# Patient Record
Sex: MALE | Race: WHITE | NOT HISPANIC OR LATINO | Employment: OTHER | URBAN - METROPOLITAN AREA
[De-identification: names, ages, dates, MRNs, and addresses within clinical notes are randomized per-mention and may not be internally consistent; named-entity substitution may affect disease eponyms.]

---

## 2017-05-11 ENCOUNTER — LAB VISIT (OUTPATIENT)
Dept: LAB | Facility: HOSPITAL | Age: 57
End: 2017-05-11
Attending: INTERNAL MEDICINE
Payer: OTHER MISCELLANEOUS

## 2017-05-11 ENCOUNTER — OFFICE VISIT (OUTPATIENT)
Dept: HEPATOLOGY | Facility: CLINIC | Age: 57
End: 2017-05-11
Payer: OTHER MISCELLANEOUS

## 2017-05-11 VITALS
TEMPERATURE: 97 F | HEART RATE: 56 BPM | HEIGHT: 74 IN | BODY MASS INDEX: 27.25 KG/M2 | RESPIRATION RATE: 18 BRPM | WEIGHT: 212.31 LBS | DIASTOLIC BLOOD PRESSURE: 76 MMHG | SYSTOLIC BLOOD PRESSURE: 118 MMHG | OXYGEN SATURATION: 100 %

## 2017-05-11 DIAGNOSIS — B18.2 HEP C W/O COMA, CHRONIC: ICD-10-CM

## 2017-05-11 DIAGNOSIS — K74.69 OTHER CIRRHOSIS OF LIVER: ICD-10-CM

## 2017-05-11 DIAGNOSIS — K70.30 ALCOHOLIC CIRRHOSIS OF LIVER WITHOUT ASCITES: ICD-10-CM

## 2017-05-11 DIAGNOSIS — K74.69 OTHER CIRRHOSIS OF LIVER: Primary | ICD-10-CM

## 2017-05-11 DIAGNOSIS — K76.9 LIVER LESION: ICD-10-CM

## 2017-05-11 LAB
AFP SERPL-MCNC: 5.6 NG/ML
ALBUMIN SERPL BCP-MCNC: 3.9 G/DL
ALP SERPL-CCNC: 85 U/L
ALT SERPL W/O P-5'-P-CCNC: 37 U/L
ANION GAP SERPL CALC-SCNC: 7 MMOL/L
AST SERPL-CCNC: 29 U/L
BASOPHILS # BLD AUTO: 0.02 K/UL
BASOPHILS NFR BLD: 0.5 %
BILIRUB SERPL-MCNC: 0.6 MG/DL
BUN SERPL-MCNC: 21 MG/DL
CALCIUM SERPL-MCNC: 9.2 MG/DL
CHLORIDE SERPL-SCNC: 106 MMOL/L
CO2 SERPL-SCNC: 27 MMOL/L
CREAT SERPL-MCNC: 1.4 MG/DL
DIFFERENTIAL METHOD: ABNORMAL
EOSINOPHIL # BLD AUTO: 0.1 K/UL
EOSINOPHIL NFR BLD: 2.8 %
ERYTHROCYTE [DISTWIDTH] IN BLOOD BY AUTOMATED COUNT: 12.8 %
EST. GFR  (AFRICAN AMERICAN): >60 ML/MIN/1.73 M^2
EST. GFR  (NON AFRICAN AMERICAN): 55.4 ML/MIN/1.73 M^2
GLUCOSE SERPL-MCNC: 91 MG/DL
HCT VFR BLD AUTO: 44.9 %
HGB BLD-MCNC: 15.8 G/DL
INR PPP: 0.9
LYMPHOCYTES # BLD AUTO: 1.4 K/UL
LYMPHOCYTES NFR BLD: 33.3 %
MCH RBC QN AUTO: 30.2 PG
MCHC RBC AUTO-ENTMCNC: 35.2 %
MCV RBC AUTO: 86 FL
MONOCYTES # BLD AUTO: 0.3 K/UL
MONOCYTES NFR BLD: 7.4 %
NEUTROPHILS # BLD AUTO: 2.4 K/UL
NEUTROPHILS NFR BLD: 55.8 %
PLATELET # BLD AUTO: 148 K/UL
PMV BLD AUTO: 8.8 FL
POTASSIUM SERPL-SCNC: 5.2 MMOL/L
PROT SERPL-MCNC: 7.4 G/DL
PROTHROMBIN TIME: 10.2 SEC
RBC # BLD AUTO: 5.24 M/UL
SODIUM SERPL-SCNC: 140 MMOL/L
WBC # BLD AUTO: 4.3 K/UL

## 2017-05-11 PROCEDURE — 87522 HEPATITIS C REVRS TRNSCRPJ: CPT

## 2017-05-11 PROCEDURE — 99213 OFFICE O/P EST LOW 20 MIN: CPT | Mod: S$GLB,,, | Performed by: INTERNAL MEDICINE

## 2017-05-11 PROCEDURE — 85610 PROTHROMBIN TIME: CPT

## 2017-05-11 PROCEDURE — 82105 ALPHA-FETOPROTEIN SERUM: CPT

## 2017-05-11 PROCEDURE — 99999 PR PBB SHADOW E&M-EST. PATIENT-LVL IV: CPT | Mod: PBBFAC,,, | Performed by: INTERNAL MEDICINE

## 2017-05-11 PROCEDURE — 85025 COMPLETE CBC W/AUTO DIFF WBC: CPT

## 2017-05-11 PROCEDURE — 80053 COMPREHEN METABOLIC PANEL: CPT

## 2017-05-11 NOTE — PROGRESS NOTES
HEPATOLOGY FOLLOW UP    Referring Physician: Primary Doctor No    Current Corresponding Physician: Primary Doctor No    Philip Presley is here for follow up of HCV-induced Cirrhosis      HPI   Philip Presley has been followed by our NP in the past. He has previously been treated with harvoni and achieved an SVR12. Early cirrhosis was found on liver biopsy, F3. His liver disease is complicated by thrombocytopenia, mildly elevated LFTs (ALT 37). The patient denies any symptoms of decompensated cirrhosis, including no ascites or edema, cognitive problems that would suggest hepatic encephalopathy, or GI bleeding from varices. No varices on EGD 05/16.    MRI 05/2016:  --There is a 1.2 cm T2 hyperintense lesion within hepatic segment VII which demonstrates homogeneous arterial enhancement which persists on delayed phases, unchanged from prior study and compatible with a small hemangioma.  --There are multiple additional areas of nodular arterial hyperenhancement throughout the liver, the largest measuring 1.2 cm in hepatic segment VIII, which appears decreased in size from prior study.  The additional enhancing lesions otherwise similar in extent to prior study, mostly subcentimeter in size, and become isointense to the liver on delayed phases.  These remain nonspecific and may represent regenerative or dysplastic nodules.    Outpatient Encounter Prescriptions as of 5/11/2017   Medication Sig Dispense Refill    chlorzoxazone (PARAFON FORTE) 500 mg Tab Take 500 mg by mouth.  2    diclofenac (VOLTAREN) 75 MG EC tablet Take 75 mg by mouth once daily.  4    LYRICA 150 mg capsule Take 150 capsules by mouth.  2    tramadol (ULTRAM) 50 mg tablet Take 50 mg by mouth.  2    zolpidem (AMBIEN CR) 12.5 MG CR tablet   2     No facility-administered encounter medications on file as of 5/11/2017.      Review of patient's allergies indicates:   Allergen Reactions    Penicillins Hives     Reaction to it when pt was a baby.     Past  Medical History:   Diagnosis Date    Cirrhosis     Fibromyalgia        Review of Systems   Constitutional: Negative.    HENT: Negative.    Eyes: Negative.    Respiratory: Negative.    Cardiovascular: Negative.    Gastrointestinal: Negative.    Genitourinary: Negative.    Musculoskeletal: Negative.    Skin: Negative.    Neurological: Negative.    Psychiatric/Behavioral: Negative.      Vitals:    05/11/17 0902   BP: 118/76   Pulse: (!) 56   Resp: 18   Temp: 96.6 °F (35.9 °C)       Physical Exam   Constitutional: He is oriented to person, place, and time. He appears well-developed and well-nourished.   HENT:   Head: Normocephalic and atraumatic.   Eyes: Conjunctivae and EOM are normal. Pupils are equal, round, and reactive to light. No scleral icterus.   Neck: Normal range of motion. Neck supple. No thyromegaly present.   Cardiovascular: Normal rate, regular rhythm and normal heart sounds.    Pulmonary/Chest: Effort normal and breath sounds normal. He has no rales.   Abdominal: Soft. Bowel sounds are normal. He exhibits no distension and no mass. There is no tenderness.   Musculoskeletal: Normal range of motion. He exhibits no edema.   Neurological: He is alert and oriented to person, place, and time.   Skin: Skin is warm and dry. No rash noted.   Psychiatric: He has a normal mood and affect.   Vitals reviewed.      MELD-Na score: 7 at 5/23/2016  9:03 AM  MELD score: 7 at 5/23/2016  9:03 AM  Calculated from:  Serum Creatinine: 1.1 mg/dL at 5/23/2016  9:03 AM  Serum Sodium: 139 mmol/L (Rounded to 137) at 5/23/2016  9:03 AM  Total Bilirubin: 0.9 mg/dL (Rounded to 1) at 5/23/2016  9:03 AM  INR(ratio): 1.0 at 5/23/2016  9:03 AM  Age: 55 years    Lab Results   Component Value Date    GLU 88 05/23/2016    BUN 17 05/23/2016    CREATININE 1.1 05/23/2016    CALCIUM 9.2 05/23/2016     05/23/2016    K 4.6 05/23/2016     05/23/2016    PROT 7.6 10/17/2016    PROT 6.9 05/23/2016    CO2 31 (H) 10/17/2016    ANIONGAP 6  (L) 05/23/2016    WBC 3.59 (L) 05/23/2016    RBC 5.22 05/23/2016    HGB 16.2 10/17/2016    HCT  10/17/2016      Comment:      (HCT) 47.1    MCV 84 05/23/2016    MCH 29.9 05/23/2016    MCHC 35.6 05/23/2016     Lab Results   Component Value Date    RDW 12.8 05/23/2016     10/17/2016    MPV 9.9 05/23/2016    GRAN 1.7 (L) 05/23/2016    GRAN 47.7 05/23/2016    LYMPH 1.5 05/23/2016    LYMPH 42.3 05/23/2016    MONO 0.2 (L) 05/23/2016    MONO 6.1 05/23/2016    EOSINOPHIL 3.3 05/23/2016    BASOPHIL 0.6 05/23/2016    EOS 0.1 05/23/2016    BASO 0.02 05/23/2016    ALBUMIN 3.8 05/23/2016    AST 36 10/17/2016    ALT 34 10/17/2016    ALKPHOS 89 05/23/2016    LABPROT 10.5 10/17/2016    INR 1.0 10/17/2016    INR 1.0 05/23/2016       Assessment and Plan:    Philip Presley is a 56 y.o. male with HCV-induced Cirrhosis  My current recommendations:  1. Compensated cirrhosis: MELD 7: remains medically early for liver transplant. Check meld labs every 6 months. Screen for HCC every 6 months, or sooner if suspicious lesions  2. Hepatic lesions, multiple: repeat MRI now  3. HCV SVR: check HCV RNA today and if negative, no need to repeat in the future  4. EGD for variceal screening: repeat in 2018.  Return 6 months

## 2017-05-11 NOTE — MR AVS SNAPSHOT
[facility] Hepatology  [address]  [facility] [address]  Phone: [phone]  Fax: [phone]                  Philip Presley   2017 9:20 AM   Office Visit    Description:  Male : 1960   Provider:  Gem Vasquez MD   Department:  [facility]           Reason for Visit     Cirrhosis           Diagnoses this Visit        Comments    Other cirrhosis of liver    -  Primary            To Do List           Future Appointments        Provider Department Dept Phone    2017 1:00 PM Lee Ann Slaughter MD [facility] - Rheumatology [phone]      Goals (5 Years of Data)     None      [facility]     [facility] On Call Nurse Care Line -  Assistance  Unless otherwise directed by your provider, please contact [facility], our nurse care line that is available for  assistance.     Registered nurses in the [facility] On Call Center provide: appointment scheduling, clinical advisement, health education, and other advisory services.  Call: [phone] (toll free)               Medications           Message regarding Medications     Verify the changes and/or additions to your medication regime listed below are the same as discussed with your clinician today.  If any of these changes or additions are incorrect, please notify your healthcare provider.             Verify that the below list of medications is an accurate representation of the medications you are currently taking.  If none reported, the list may be blank. If incorrect, please contact your healthcare provider. Carry this list with you in case of emergency.           Current Medications     chlorzoxazone (PARAFON FORTE) 500 mg Tab Take 500 mg by mouth.    diclofenac (VOLTAREN) 75 MG EC tablet Take 75 mg by mouth once daily.    LYRICA 150 mg capsule Take 150 capsules by mouth.    tramadol (ULTRAM) 50 mg tablet Take 50 mg by mouth.    zolpidem (AMBIEN CR) 12.5 MG CR tablet            Clinical Reference  "Information           Your Vitals Were     BP Pulse Temp Resp Height Weight    118/76 (BP Location: Left arm, Patient Position: Sitting) 56 96.6 °F (35.9 °C) (Oral) 18 6' 2" (1.88 m) 96.3 kg (212 lb 4.9 oz)    SpO2 BMI             100% 27.26 kg/m2         Blood Pressure          Most Recent Value    BP  118/76      Allergies as of 5/11/2017     Penicillins      Immunizations Administered on Date of Encounter - 5/11/2017     None      Orders Placed During Today's Visit     Future Labs/Procedures Expected by Expires    AFP tumor marker  5/11/2017 7/10/2018    CBC auto differential  5/11/2017 5/11/2018    Comprehensive metabolic panel  5/11/2017 5/11/2018    Hepatitis C RNA, quantitative, PCR  5/11/2017 5/11/2018    MRI Abdomen W WO Contrast  5/11/2017 5/11/2018    Protime-INR  5/11/2017 5/11/2018      Instructions    1. Labs today  2. MRI now to f/u on liver lesions  3. HCV RNA  Return 6 months       Language Assistance Services     ATTENTION: Language assistance services are available, free of charge. Please call [phone].      ATENCIÓN: Si habla español, tiene a davis disposición servicios gratuitos de asistencia lingüística. Llame al [phone].     BUSHRA Ý: N?u b?n nói Ti?ng Vi?t, có các d?ch v? h? tr? ngôn ng? mi?n phí dành cho b?n. G?i s? [phone].         [facility] complies with applicable Federal civil rights laws and does not discriminate on the basis of race, color, national origin, age, disability, or sex.        "

## 2017-05-12 LAB
HCV LOG: <1.08 LOG (10) IU/ML
HCV RNA QUANT PCR: <12 IU/ML
HCV, QUALITATIVE: NOT DETECTED IU/ML

## 2017-05-29 ENCOUNTER — HOSPITAL ENCOUNTER (OUTPATIENT)
Dept: RADIOLOGY | Facility: HOSPITAL | Age: 57
Discharge: HOME | End: 2017-05-29
Attending: INTERNAL MEDICINE
Payer: OTHER MISCELLANEOUS

## 2017-05-29 DIAGNOSIS — K74.69 OTHER CIRRHOSIS OF LIVER: ICD-10-CM

## 2017-05-29 PROCEDURE — 25500020 PHARM REV CODE 255: Performed by: INTERNAL MEDICINE

## 2017-05-29 PROCEDURE — 74183 MRI ABD W/O CNTR FLWD CNTR: CPT | Mod: TC

## 2017-05-29 PROCEDURE — A9585 GADOBUTROL INJECTION: HCPCS | Performed by: INTERNAL MEDICINE

## 2017-05-29 PROCEDURE — 74183 MRI ABD W/O CNTR FLWD CNTR: CPT | Mod: 26,,, | Performed by: RADIOLOGY

## 2017-05-29 RX ORDER — GADOBUTROL 604.72 MG/ML
10 INJECTION INTRAVENOUS
Status: COMPLETED | OUTPATIENT
Start: 2017-05-29 | End: 2017-05-29

## 2017-05-29 RX ADMIN — GADOBUTROL 10 ML: 604.72 INJECTION INTRAVENOUS at 11:05

## 2017-05-30 ENCOUNTER — TELEPHONE (OUTPATIENT)
Dept: HEPATOLOGY | Facility: CLINIC | Age: 57
End: 2017-05-30

## 2017-05-30 DIAGNOSIS — K76.9 LIVER LESION: Primary | ICD-10-CM

## 2017-05-31 ENCOUNTER — PATIENT MESSAGE (OUTPATIENT)
Dept: HEPATOLOGY | Facility: CLINIC | Age: 57
End: 2017-05-31

## 2017-05-31 ENCOUNTER — TELEPHONE (OUTPATIENT)
Dept: HEPATOLOGY | Facility: CLINIC | Age: 57
End: 2017-05-31

## 2017-05-31 NOTE — TELEPHONE ENCOUNTER
Called and spoke with patient, relayed message from Dr Vasquez.Recall placed in system for MRI.  Stable lesion on mri - repeat in 6 months - please let patient know.   Patient verbalizes understanding.

## 2017-10-12 ENCOUNTER — TELEPHONE (OUTPATIENT)
Dept: HEPATOLOGY | Facility: CLINIC | Age: 57
End: 2017-10-12

## 2017-10-12 NOTE — TELEPHONE ENCOUNTER
----- Message from Jolie Fleming sent at 10/11/2017  4:26 PM CDT -----  Contact: Pt  Pt received a letter to schedule an MRI & appt with ,    Pt contact number [phone]  Thanks

## 2017-11-17 ENCOUNTER — HOSPITAL ENCOUNTER (OUTPATIENT)
Dept: RADIOLOGY | Facility: HOSPITAL | Age: 57
Discharge: HOME | End: 2017-11-17
Attending: INTERNAL MEDICINE
Payer: OTHER MISCELLANEOUS

## 2017-11-17 ENCOUNTER — OFFICE VISIT (OUTPATIENT)
Dept: HEPATOLOGY | Facility: CLINIC | Age: 57
End: 2017-11-17
Payer: OTHER MISCELLANEOUS

## 2017-11-17 ENCOUNTER — LAB VISIT (OUTPATIENT)
Dept: LAB | Facility: HOSPITAL | Age: 57
End: 2017-11-17
Attending: INTERNAL MEDICINE
Payer: OTHER MISCELLANEOUS

## 2017-11-17 VITALS
WEIGHT: 197.31 LBS | TEMPERATURE: 98 F | OXYGEN SATURATION: 97 % | BODY MASS INDEX: 25.32 KG/M2 | HEIGHT: 74 IN | HEART RATE: 74 BPM | RESPIRATION RATE: 18 BRPM | SYSTOLIC BLOOD PRESSURE: 119 MMHG | DIASTOLIC BLOOD PRESSURE: 80 MMHG

## 2017-11-17 DIAGNOSIS — K74.60 CIRRHOSIS OF LIVER WITHOUT ASCITES, UNSPECIFIED HEPATIC CIRRHOSIS TYPE: ICD-10-CM

## 2017-11-17 DIAGNOSIS — B18.2 HEP C W/O COMA, CHRONIC: ICD-10-CM

## 2017-11-17 DIAGNOSIS — K76.9 LIVER LESION: ICD-10-CM

## 2017-11-17 DIAGNOSIS — K74.69 OTHER CIRRHOSIS OF LIVER: Primary | ICD-10-CM

## 2017-11-17 DIAGNOSIS — L80 VITILIGO: ICD-10-CM

## 2017-11-17 DIAGNOSIS — K74.69 OTHER CIRRHOSIS OF LIVER: ICD-10-CM

## 2017-11-17 LAB
AFP SERPL-MCNC: 4.5 NG/ML
ALBUMIN SERPL BCP-MCNC: 3.8 G/DL
ALP SERPL-CCNC: 68 U/L
ALT SERPL W/O P-5'-P-CCNC: 35 U/L
ANION GAP SERPL CALC-SCNC: 9 MMOL/L
AST SERPL-CCNC: 27 U/L
BASOPHILS # BLD AUTO: 0.02 K/UL
BASOPHILS NFR BLD: 0.4 %
BILIRUB SERPL-MCNC: 1 MG/DL
BUN SERPL-MCNC: 14 MG/DL
CALCIUM SERPL-MCNC: 9.9 MG/DL
CHLORIDE SERPL-SCNC: 106 MMOL/L
CO2 SERPL-SCNC: 27 MMOL/L
CREAT SERPL-MCNC: 1.2 MG/DL
DIFFERENTIAL METHOD: NORMAL
EOSINOPHIL # BLD AUTO: 0.1 K/UL
EOSINOPHIL NFR BLD: 1.8 %
ERYTHROCYTE [DISTWIDTH] IN BLOOD BY AUTOMATED COUNT: 13 %
EST. GFR  (AFRICAN AMERICAN): >60 ML/MIN/1.73 M^2
EST. GFR  (NON AFRICAN AMERICAN): >60 ML/MIN/1.73 M^2
GLUCOSE SERPL-MCNC: 85 MG/DL
HCT VFR BLD AUTO: 43.9 %
HGB BLD-MCNC: 15.3 G/DL
IMM GRANULOCYTES # BLD AUTO: 0.01 K/UL
IMM GRANULOCYTES NFR BLD AUTO: 0.2 %
INR PPP: 1
LYMPHOCYTES # BLD AUTO: 1.4 K/UL
LYMPHOCYTES NFR BLD: 27.7 %
MCH RBC QN AUTO: 30.2 PG
MCHC RBC AUTO-ENTMCNC: 34.9 G/DL
MCV RBC AUTO: 87 FL
MONOCYTES # BLD AUTO: 0.3 K/UL
MONOCYTES NFR BLD: 6.6 %
NEUTROPHILS # BLD AUTO: 3.2 K/UL
NEUTROPHILS NFR BLD: 63.3 %
NRBC BLD-RTO: 0 /100 WBC
PLATELET # BLD AUTO: 202 K/UL
PMV BLD AUTO: 9.4 FL
POTASSIUM SERPL-SCNC: 4.4 MMOL/L
PROT SERPL-MCNC: 7.4 G/DL
PROTHROMBIN TIME: 10.5 SEC
RBC # BLD AUTO: 5.06 M/UL
SODIUM SERPL-SCNC: 142 MMOL/L
WBC # BLD AUTO: 5.02 K/UL

## 2017-11-17 PROCEDURE — 99999 PR PBB SHADOW E&M-EST. PATIENT-LVL IV: CPT | Mod: PBBFAC,,, | Performed by: INTERNAL MEDICINE

## 2017-11-17 PROCEDURE — 74183 MRI ABD W/O CNTR FLWD CNTR: CPT | Mod: 26,,, | Performed by: RADIOLOGY

## 2017-11-17 PROCEDURE — 25500020 PHARM REV CODE 255: Performed by: INTERNAL MEDICINE

## 2017-11-17 PROCEDURE — 85025 COMPLETE CBC W/AUTO DIFF WBC: CPT

## 2017-11-17 PROCEDURE — 80053 COMPREHEN METABOLIC PANEL: CPT

## 2017-11-17 PROCEDURE — 36415 COLL VENOUS BLD VENIPUNCTURE: CPT

## 2017-11-17 PROCEDURE — A9585 GADOBUTROL INJECTION: HCPCS | Performed by: INTERNAL MEDICINE

## 2017-11-17 PROCEDURE — 85610 PROTHROMBIN TIME: CPT

## 2017-11-17 PROCEDURE — 82105 ALPHA-FETOPROTEIN SERUM: CPT

## 2017-11-17 PROCEDURE — 74183 MRI ABD W/O CNTR FLWD CNTR: CPT | Mod: TC

## 2017-11-17 PROCEDURE — 99214 OFFICE O/P EST MOD 30 MIN: CPT | Mod: S$GLB,,, | Performed by: INTERNAL MEDICINE

## 2017-11-17 RX ORDER — GADOBUTROL 604.72 MG/ML
10 INJECTION INTRAVENOUS
Status: COMPLETED | OUTPATIENT
Start: 2017-11-17 | End: 2017-11-17

## 2017-11-17 RX ADMIN — GADOBUTROL 10 ML: 604.72 INJECTION INTRAVENOUS at 01:11

## 2017-11-17 NOTE — PROGRESS NOTES
HEPATOLOGY FOLLOW UP    Referring Physician: Primary Doctor No    Current Corresponding Physician: Primary Doctor No    Philip Presley is here for follow up of HCV-induced Cirrhosis      HPI   Philip Presley has been followed by our NP in the past. He has previously been treated with harvoni and achieved an SVR12 HCV VL last checked 05/17 and was still negative; hx of liver biopsy, F3. His liver disease is complicated by thrombocytopenia, mildly elevated LFTs (ALT 37). The low plts would suggest cirrhosis. The patient denies any symptoms of decompensated cirrhosis, including no ascites or edema, cognitive problems that would suggest hepatic encephalopathy, or GI bleeding from varices. No varices on EGD 05/16.    He is feeling well. He denies N/V, abdominal pain or diarrhea. He has brought to my attention that the acquisition of his HCV was likely work-related. He is still in the midst of resolution of a workman's comp claim re this.     MRI 05/2016:  --There is a 1.2 cm T2 hyperintense lesion within hepatic segment VII which demonstrates homogeneous arterial enhancement which persists on delayed phases, unchanged from prior study and compatible with a small hemangioma.  --There are multiple additional areas of nodular arterial hyperenhancement throughout the liver, the largest measuring 1.2 cm in hepatic segment VIII, which appears decreased in size from prior study.  The additional enhancing lesions otherwise similar in extent to prior study, mostly subcentimeter in size, and become isointense to the liver on delayed phases.  These remain nonspecific and may represent regenerative or dysplastic nodules.    MRI 11/1717:  Multiple stable areas of arterial hyperenhancement seen scattered throughout the hepatic parenchyma.  No new lesions identified.        Outpatient Encounter Prescriptions as of 11/17/2017   Medication Sig Dispense Refill    chlorzoxazone (PARAFON FORTE) 500 mg Tab Take 500 mg by mouth.  2     diclofenac (VOLTAREN) 75 MG EC tablet Take 75 mg by mouth once daily.  4    LYRICA 150 mg capsule Take 150 capsules by mouth.  2    tramadol (ULTRAM) 50 mg tablet Take 50 mg by mouth.  2    zolpidem (AMBIEN CR) 12.5 MG CR tablet   2     Facility-Administered Encounter Medications as of 11/17/2017   Medication Dose Route Frequency Provider Last Rate Last Dose    [COMPLETED] gadobutrol 10 mL  10 mL Intravenous ONCE PRN Gem Vasquez MD   10 mL at 11/17/17 1340     Review of patient's allergies indicates:   Allergen Reactions    Penicillins Hives     Reaction to it when pt was a baby.     Past Medical History:   Diagnosis Date    Cirrhosis     Fibromyalgia        Review of Systems   Constitutional: Negative.    HENT: Negative.    Eyes: Negative.    Respiratory: Negative.    Cardiovascular: Negative.    Gastrointestinal: Negative.    Genitourinary: Negative.    Musculoskeletal: Negative.    Skin: Negative.    Neurological: Negative.    Psychiatric/Behavioral: Negative.      Vitals:    11/17/17 1450   BP: 119/80   Pulse: 74   Resp: 18   Temp: 98 °F (36.7 °C)       Physical Exam   Constitutional: He is oriented to person, place, and time. He appears well-developed and well-nourished.   HENT:   Head: Normocephalic and atraumatic.   Eyes: Conjunctivae and EOM are normal. Pupils are equal, round, and reactive to light. No scleral icterus.   Neck: Normal range of motion. Neck supple. No thyromegaly present.   Cardiovascular: Normal rate, regular rhythm and normal heart sounds.    Pulmonary/Chest: Effort normal and breath sounds normal. He has no rales.   Abdominal: Soft. Bowel sounds are normal. He exhibits no distension and no mass. There is no tenderness.   Musculoskeletal: Normal range of motion. He exhibits no edema.   Neurological: He is alert and oriented to person, place, and time.   Skin: Skin is warm and dry. No rash noted.   +vitiligo   Psychiatric: He has a normal mood and affect.   Vitals  reviewed.      MELD-Na score: 10 at 5/11/2017 10:03 AM  MELD score: 10 at 5/11/2017 10:03 AM  Calculated from:  Serum Creatinine: 1.4 mg/dL at 5/11/2017 10:03 AM  Serum Sodium: 140 mmol/L (Rounded to 137) at 5/11/2017 10:03 AM  Total Bilirubin: 0.6 mg/dL (Rounded to 1) at 5/11/2017 10:03 AM  INR(ratio): 0.9 (Rounded to 1) at 5/11/2017 10:03 AM  Age: 56 years    Lab Results   Component Value Date    GLU 91 05/11/2017    BUN 21 (H) 05/11/2017    CREATININE 1.4 05/11/2017    CALCIUM 9.2 05/11/2017     05/11/2017    K 5.2 (H) 05/11/2017     05/11/2017    PROT 7.4 05/11/2017    CO2 27 05/11/2017    CO2 31 (H) 10/17/2016    ANIONGAP 7 (L) 05/11/2017    WBC 4.30 05/11/2017    RBC 5.24 05/11/2017    HGB 15.8 05/11/2017    HCT 44.9 05/11/2017    MCV 86 05/11/2017    MCH 30.2 05/11/2017    MCHC 35.2 05/11/2017     Lab Results   Component Value Date    RDW 12.8 05/11/2017     (L) 05/11/2017    MPV 8.8 (L) 05/11/2017    GRAN 2.4 05/11/2017    GRAN 55.8 05/11/2017    LYMPH 1.4 05/11/2017    LYMPH 33.3 05/11/2017    MONO 0.3 05/11/2017    MONO 7.4 05/11/2017    EOSINOPHIL 2.8 05/11/2017    BASOPHIL 0.5 05/11/2017    EOS 0.1 05/11/2017    BASO 0.02 05/11/2017    ALBUMIN 3.9 05/11/2017    AST 29 05/11/2017    AST 36 10/17/2016    ALT 37 05/11/2017    ALT 34 10/17/2016    ALKPHOS 85 05/11/2017    LABPROT 10.2 05/11/2017    INR 0.9 05/11/2017       Assessment and Plan:    Philip Presley is a 57 y.o. male with HCV-induced Cirrhosis  My current recommendations:  1. Compensated cirrhosis: MELD 10: remains medically early for liver transplant. Check meld labs every 6 months. Screen for HCC every 6 months, or sooner if suspicious lesions  2. Hepatic lesions, multiple, stable: repeat MRI in 6 mos- if remain stable consider switch to US  3. HCV SVR: has attained an SVR  4. EGD for variceal screening: repeat in 2018/19.  5. Vitiligo: started 2010 (after dx of HCV). HCV is associated with viligo.  Return 6 months    .

## 2017-11-17 NOTE — PATIENT INSTRUCTIONS
1. EGD in 2018/19  2. Labs today and in 6 months  3. Repeat MRI in 6 months then if stable will do US 6 months later  4. Vitiligo-started 2010 (after HCV  Dx)  Return 6 months

## 2017-11-19 PROBLEM — L80 VITILIGO: Status: ACTIVE | Noted: 2017-11-19

## 2017-12-22 ENCOUNTER — TELEPHONE (OUTPATIENT)
Dept: HEPATOLOGY | Facility: CLINIC | Age: 57
End: 2017-12-22

## 2017-12-22 NOTE — TELEPHONE ENCOUNTER
MA called marco antonio back she is unable to reached left her VM to please give us a callback. IVAN

## 2017-12-22 NOTE — TELEPHONE ENCOUNTER
----- Message from Adebayo Gillespie sent at 12/22/2017  8:34 AM CST -----  Contact: Uzma Sadler- [place] [employer]  Please call regarding scheduling an appt for rehab conference.    Call her @ [phone]

## 2018-01-11 NOTE — TELEPHONE ENCOUNTER
Judy faxed workers comp form to be filled out.     Faxed the form to our HIM/Disability dept. X [phone]

## 2018-02-01 ENCOUNTER — TELEPHONE (OUTPATIENT)
Dept: GASTROENTEROLOGY | Facility: CLINIC | Age: 58
End: 2018-02-01

## 2018-02-01 NOTE — TELEPHONE ENCOUNTER
Call to pt's  re his status. Explained that he is cured of HCV and has cirrhosis but no liver dysfunction. Did not specifically address the questions that WC has asked. Judy agreed to have pt come back to see me to discus. Called pt and let him know. He is agreeable to coming in.

## 2018-02-15 ENCOUNTER — OFFICE VISIT (OUTPATIENT)
Dept: HEPATOLOGY | Facility: CLINIC | Age: 58
End: 2018-02-15
Payer: OTHER MISCELLANEOUS

## 2018-02-15 VITALS
TEMPERATURE: 98 F | WEIGHT: 203.06 LBS | BODY MASS INDEX: 26.06 KG/M2 | HEART RATE: 57 BPM | HEIGHT: 74 IN | RESPIRATION RATE: 18 BRPM | SYSTOLIC BLOOD PRESSURE: 119 MMHG | DIASTOLIC BLOOD PRESSURE: 77 MMHG | OXYGEN SATURATION: 99 %

## 2018-02-15 DIAGNOSIS — K76.9 LIVER LESION: ICD-10-CM

## 2018-02-15 DIAGNOSIS — K76.6 HYPERTENSION, PORTAL: ICD-10-CM

## 2018-02-15 DIAGNOSIS — B18.2 HEP C W/O COMA, CHRONIC: ICD-10-CM

## 2018-02-15 DIAGNOSIS — K74.60 CIRRHOSIS OF LIVER WITHOUT ASCITES, UNSPECIFIED HEPATIC CIRRHOSIS TYPE: Primary | ICD-10-CM

## 2018-02-15 PROCEDURE — 3008F BODY MASS INDEX DOCD: CPT | Mod: S$GLB,,, | Performed by: INTERNAL MEDICINE

## 2018-02-15 PROCEDURE — 99999 PR PBB SHADOW E&M-EST. PATIENT-LVL IV: CPT | Mod: PBBFAC,,, | Performed by: INTERNAL MEDICINE

## 2018-02-15 PROCEDURE — 99214 OFFICE O/P EST MOD 30 MIN: CPT | Mod: S$GLB,,, | Performed by: INTERNAL MEDICINE

## 2018-02-15 NOTE — PROGRESS NOTES
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    HPI   Philip Presley has previously been treated with harvoni and achieved an SVR12 HCV VL last checked 05/17 and was still negative; hx of liver biopsy, F3. His liver disease is complicated by thrombocytopenia, mildly elevated LFTs (ALT 37). The low plts would suggest cirrhosis. Platelets today are improved to 214. The patient denies any symptoms of decompensated cirrhosis, including no ascites or edema, cognitive problems that would suggest hepatic encephalopathy, or GI bleeding from varices. No varices on EGD 05/16.    He is feeling well. He denies N/V, abdominal pain or diarrhea.     Liver function is normal: Tbil 0.6, albumin 4.0, creat 1.2, Na 141, INR 1.0.    MRI 11/1717:  Multiple stable areas of arterial hyperenhancement seen scattered throughout the hepatic parenchyma.  No new lesions identified. Nothing to suggest hepatoma. AFP is 5.1 today.    Outpatient Encounter Prescriptions as of 2/15/2018   Medication Sig Dispense Refill    chlorzoxazone (PARAFON FORTE) 500 mg Tab Take 500 mg by mouth.  2    diclofenac (VOLTAREN) 75 MG EC tablet Take 75 mg by mouth once daily.  4    LYRICA 150 mg capsule Take 150 capsules by mouth 2 (two) times daily.   2    tramadol (ULTRAM) 50 mg tablet Take 100 mg by mouth every 4 (four) hours as needed.   2    zolpidem (AMBIEN CR) 12.5 MG CR tablet Take 12.5 mg by mouth nightly as needed.   2     No facility-administered encounter medications on file as of 2/15/2018.      Review of patient's allergies indicates:   Allergen Reactions    Penicillins Hives     Reaction to it when pt was a baby.     Past Medical History:   Diagnosis Date    Cirrhosis     Fibromyalgia     Vitiligo 11/19/2017       Review of Systems    Constitutional: Negative.    HENT: Negative.    Eyes: Negative.    Respiratory: Negative.    Cardiovascular: Negative.    Gastrointestinal: Negative.    Genitourinary: Negative.    Musculoskeletal: Negative.    Skin: Negative.    Neurological: Negative.    Psychiatric/Behavioral: Negative.      Vitals:    02/15/18 1555   BP: 119/77   Pulse: (!) 57   Resp: 18   Temp: 97.8 °F (36.6 °C)       Physical Exam   Constitutional: He is oriented to person, place, and time. He appears well-developed and well-nourished.   HENT:   Head: Normocephalic and atraumatic.   Eyes: Conjunctivae and EOM are normal. Pupils are equal, round, and reactive to light. No scleral icterus.   Neck: Normal range of motion. Neck supple. No thyromegaly present.   Cardiovascular: Normal rate, regular rhythm and normal heart sounds.    Pulmonary/Chest: Effort normal and breath sounds normal. He has no rales.   Abdominal: Soft. Bowel sounds are normal. He exhibits no distension and no mass. There is no tenderness.   Musculoskeletal: Normal range of motion. He exhibits no edema.   Neurological: He is alert and oriented to person, place, and time.   Skin: Skin is warm and dry. No rash noted.   +vitiligo   Psychiatric: He has a normal mood and affect.   Vitals reviewed.    MELD-Na score: 8 at 2/15/2018  5:17 PM  MELD score: 8 at 2/15/2018  5:17 PM  Calculated from:  Serum Creatinine: 1.2 mg/dL at 2/15/2018  5:17 PM  Serum Sodium: 141 mmol/L (Rounded to 137) at 2/15/2018  5:17 PM  Total Bilirubin: 0.6 mg/dL (Rounded to 1) at 2/15/2018  5:17 PM  INR(ratio): 1.0 at 2/15/2018  5:17 PM  Age: 57 years      Lab Results   Component Value Date    GLU 85 11/17/2017    BUN 14 11/17/2017    CREATININE 1.2 11/17/2017    CALCIUM 9.9 11/17/2017     11/17/2017    K 4.4 11/17/2017     11/17/2017    PROT 7.4 11/17/2017    CO2 27 11/17/2017    CO2 31 (H) 10/17/2016    ANIONGAP 9 11/17/2017    WBC 5.02 11/17/2017    RBC 5.06 11/17/2017    HGB 15.3 11/17/2017     HCT 43.9 11/17/2017    MCV 87 11/17/2017    MCH 30.2 11/17/2017    MCHC 34.9 11/17/2017     Lab Results   Component Value Date    RDW 13.0 11/17/2017     11/17/2017    MPV 9.4 11/17/2017    GRAN 3.2 11/17/2017    GRAN 63.3 11/17/2017    LYMPH 1.4 11/17/2017    LYMPH 27.7 11/17/2017    MONO 0.3 11/17/2017    MONO 6.6 11/17/2017    EOSINOPHIL 1.8 11/17/2017    BASOPHIL 0.4 11/17/2017    EOS 0.1 11/17/2017    BASO 0.02 11/17/2017    ALBUMIN 3.8 11/17/2017    AST 27 11/17/2017    AST 36 10/17/2016    ALT 35 11/17/2017    ALT 34 10/17/2016    ALKPHOS 68 11/17/2017    LABPROT 10.5 11/17/2017    INR 1.0 11/17/2017       Assessment and Plan:    Philip Presley is a 57 y.o. male with HCV-induced Cirrhosis  My current recommendations:  1. Compensated cirrhosis: MELD 8: remains medically early for liver transplant. Check meld labs every 6 months. Hepatic lesions, multiple, stable: repeat MRI now- if remain stable consider switch to US  3. HCV SVR: has attained an SVR  4. EGD for variceal screening: repeat in 2018/19.  5. Vitiligo: started 2010 (after dx of HCV). HCV is associated with viligo. Discussed that if returns to work, should be in a job that avoids sun exposure.  Return 6 months    . .

## 2018-05-21 ENCOUNTER — LAB VISIT (OUTPATIENT)
Dept: LAB | Facility: HOSPITAL | Age: 58
End: 2018-05-21
Attending: INTERNAL MEDICINE
Payer: OTHER MISCELLANEOUS

## 2018-05-21 ENCOUNTER — HOSPITAL ENCOUNTER (OUTPATIENT)
Dept: RADIOLOGY | Facility: HOSPITAL | Age: 58
Discharge: HOME | End: 2018-05-21
Attending: INTERNAL MEDICINE
Payer: OTHER MISCELLANEOUS

## 2018-05-21 ENCOUNTER — OFFICE VISIT (OUTPATIENT)
Dept: HEPATOLOGY | Facility: CLINIC | Age: 58
End: 2018-05-21
Payer: OTHER MISCELLANEOUS

## 2018-05-21 VITALS
RESPIRATION RATE: 18 BRPM | HEIGHT: 74 IN | WEIGHT: 210.75 LBS | SYSTOLIC BLOOD PRESSURE: 124 MMHG | DIASTOLIC BLOOD PRESSURE: 87 MMHG | BODY MASS INDEX: 27.05 KG/M2 | TEMPERATURE: 98 F | OXYGEN SATURATION: 98 % | HEART RATE: 56 BPM

## 2018-05-21 DIAGNOSIS — R16.0 LIVER MASSES: ICD-10-CM

## 2018-05-21 DIAGNOSIS — R74.8 ELEVATED LIVER ENZYMES: ICD-10-CM

## 2018-05-21 DIAGNOSIS — R21 RASH: ICD-10-CM

## 2018-05-21 DIAGNOSIS — K74.60 CIRRHOSIS OF LIVER WITHOUT ASCITES, UNSPECIFIED HEPATIC CIRRHOSIS TYPE: Primary | ICD-10-CM

## 2018-05-21 DIAGNOSIS — K74.69 OTHER CIRRHOSIS OF LIVER: ICD-10-CM

## 2018-05-21 DIAGNOSIS — K76.6 HYPERTENSION, PORTAL: ICD-10-CM

## 2018-05-21 DIAGNOSIS — Z86.19 HISTORY OF HEPATITIS C: ICD-10-CM

## 2018-05-21 DIAGNOSIS — L80 VITILIGO: ICD-10-CM

## 2018-05-21 LAB
AFP SERPL-MCNC: 5.1 NG/ML
ALBUMIN SERPL BCP-MCNC: 4.3 G/DL
ALP SERPL-CCNC: 69 U/L
ALT SERPL W/O P-5'-P-CCNC: 63 U/L
ANION GAP SERPL CALC-SCNC: 8 MMOL/L
AST SERPL-CCNC: 39 U/L
BASOPHILS # BLD AUTO: 0.03 K/UL
BASOPHILS NFR BLD: 0.7 %
BILIRUB SERPL-MCNC: 0.8 MG/DL
BUN SERPL-MCNC: 18 MG/DL
CALCIUM SERPL-MCNC: 9.7 MG/DL
CHLORIDE SERPL-SCNC: 104 MMOL/L
CO2 SERPL-SCNC: 26 MMOL/L
CREAT SERPL-MCNC: 1.2 MG/DL
DIFFERENTIAL METHOD: NORMAL
EOSINOPHIL # BLD AUTO: 0.2 K/UL
EOSINOPHIL NFR BLD: 3.4 %
ERYTHROCYTE [DISTWIDTH] IN BLOOD BY AUTOMATED COUNT: 12.8 %
EST. GFR  (AFRICAN AMERICAN): >60 ML/MIN/1.73 M^2
EST. GFR  (NON AFRICAN AMERICAN): >60 ML/MIN/1.73 M^2
GLUCOSE SERPL-MCNC: 88 MG/DL
HCT VFR BLD AUTO: 47.6 %
HGB BLD-MCNC: 16.2 G/DL
IMM GRANULOCYTES # BLD AUTO: 0.01 K/UL
IMM GRANULOCYTES NFR BLD AUTO: 0.2 %
INR PPP: 0.9
LYMPHOCYTES # BLD AUTO: 1.1 K/UL
LYMPHOCYTES NFR BLD: 25.7 %
MCH RBC QN AUTO: 29.6 PG
MCHC RBC AUTO-ENTMCNC: 34 G/DL
MCV RBC AUTO: 87 FL
MONOCYTES # BLD AUTO: 0.3 K/UL
MONOCYTES NFR BLD: 7.2 %
NEUTROPHILS # BLD AUTO: 2.8 K/UL
NEUTROPHILS NFR BLD: 62.8 %
NRBC BLD-RTO: 0 /100 WBC
PLATELET # BLD AUTO: 209 K/UL
PMV BLD AUTO: 9.5 FL
POTASSIUM SERPL-SCNC: 5.1 MMOL/L
PROT SERPL-MCNC: 7.6 G/DL
PROTHROMBIN TIME: 9.9 SEC
RBC # BLD AUTO: 5.48 M/UL
SODIUM SERPL-SCNC: 138 MMOL/L
WBC # BLD AUTO: 4.44 K/UL

## 2018-05-21 PROCEDURE — 85610 PROTHROMBIN TIME: CPT

## 2018-05-21 PROCEDURE — 80053 COMPREHEN METABOLIC PANEL: CPT

## 2018-05-21 PROCEDURE — 85025 COMPLETE CBC W/AUTO DIFF WBC: CPT

## 2018-05-21 PROCEDURE — 99213 OFFICE O/P EST LOW 20 MIN: CPT | Mod: S$GLB,,, | Performed by: NURSE PRACTITIONER

## 2018-05-21 PROCEDURE — 25500020 PHARM REV CODE 255: Performed by: INTERNAL MEDICINE

## 2018-05-21 PROCEDURE — 36415 COLL VENOUS BLD VENIPUNCTURE: CPT

## 2018-05-21 PROCEDURE — 74183 MRI ABD W/O CNTR FLWD CNTR: CPT | Mod: 26,,, | Performed by: RADIOLOGY

## 2018-05-21 PROCEDURE — 99999 PR PBB SHADOW E&M-EST. PATIENT-LVL IV: CPT | Mod: PBBFAC,,, | Performed by: NURSE PRACTITIONER

## 2018-05-21 PROCEDURE — A9585 GADOBUTROL INJECTION: HCPCS | Performed by: INTERNAL MEDICINE

## 2018-05-21 PROCEDURE — 82105 ALPHA-FETOPROTEIN SERUM: CPT

## 2018-05-21 PROCEDURE — 3008F BODY MASS INDEX DOCD: CPT | Mod: CPTII,S$GLB,, | Performed by: NURSE PRACTITIONER

## 2018-05-21 PROCEDURE — 74183 MRI ABD W/O CNTR FLWD CNTR: CPT | Mod: TC

## 2018-05-21 RX ORDER — GADOBUTROL 604.72 MG/ML
10 INJECTION INTRAVENOUS
Status: COMPLETED | OUTPATIENT
Start: 2018-05-21 | End: 2018-05-21

## 2018-05-21 RX ADMIN — GADOBUTROL 10 ML: 604.72 INJECTION INTRAVENOUS at 09:05

## 2018-05-21 NOTE — PROGRESS NOTES
[facility] Established Patient Visit    Reason for Visit:  F/u cirrhosis, liver masses    PCP: Primary Doctor No    HPI:  This is a 57 y.o. male pt of Dr. Vasquez, new pt to me, here for f/u of well compensated cirrhosis due to h/o hepatitis C. He was last seen by Dr. Vasquez in 2/2018. He has been successfully treated for his hep C with Harvoni and had SVR 24 as of 10/2016. He had a liver biopsy in the past with stage 3 fibrosis. His cirrhosis has been stable since attaining SVR.     He had labs and MRI today for monitoring of his liver masses. His MRI shows stable lesions, none concerning for HCC. AFP nl. He feels well except for a rash on his forehead that has been persistent since 12/2017. He does have vitiligo from his hepatitis C. He uses a cream he was given for his vitiligo on his forehead that has been helping. He has not seen a dermatologist for his forehead rash. He denies jaundice, dark urine, hematemesis, melena, slowed mentation, abdominal distention.       His labs today show ALT higher than previously at 63, AST 39. He has gained weight since his last visit and since 2017, ~ 10 lbs. Could be d/t NAFLD. He denies any herbal supplements or new meds.       ROS:   GENERAL: Denies fever, chills, (+) weight gain, no fatigue  HEENT: Denies headaches, dizziness, vision/hearing changes  CARDIOVASCULAR: Denies chest pain, palpitations, or edema  RESPIRATORY: Denies dyspnea, cough  GI: Denies abdominal pain, rectal bleeding, nausea, vomiting. No change in bowel pattern or color  : Denies dysuria, hematuria   SKIN: (+) rash, itching   NEURO: Denies confusion, memory loss, or mood changes  PSYCH: Denies depression or anxiety  HEME/LYMPH: Denies easy bruising or bleeding      PMHX:  has a past medical history of Cirrhosis; Fibromyalgia; and Vitiligo (11/19/2017).    PSHX:  has a past surgical history that includes Hand surgery; Nose surgery; and Upper gastrointestinal endoscopy.    The patient's  "social and family histories were reviewed by me and updated in the appropriate section of the electronic medical record.    Review of patient's allergies indicates:   Allergen Reactions    Penicillins Hives     Reaction to it when pt was a baby.       Current Outpatient Prescriptions on File Prior to Visit   Medication Sig Dispense Refill    chlorzoxazone (PARAFON FORTE) 500 mg Tab Take 500 mg by mouth.  2    diclofenac (VOLTAREN) 75 MG EC tablet Take 75 mg by mouth once daily.  4    LYRICA 150 mg capsule Take 150 capsules by mouth 2 (two) times daily.   2    tramadol (ULTRAM) 50 mg tablet Take 100 mg by mouth every 4 (four) hours as needed.   2    zolpidem (AMBIEN CR) 12.5 MG CR tablet Take 12.5 mg by mouth nightly as needed.   2     Current Facility-Administered Medications on File Prior to Visit   Medication Dose Route Frequency Provider Last Rate Last Dose    [COMPLETED] gadobutrol 10 mL  10 mL Intravenous ONCE PRN Gem Vasquez MD   10 mL at 05/21/18 0936         Objective Findings:    PHYSICAL EXAM:   Friendly White male, in no acute distress; alert and oriented to person, place and time  VITALS: /87 (BP Location: Left arm, Patient Position: Sitting)   Pulse (!) 56   Temp 98 °F (36.7 °C) (Oral)   Resp 18   Ht 6' 2" (1.88 m)   Wt 95.6 kg (210 lb 12.2 oz)   SpO2 98%   BMI 27.06 kg/m²   HENT: Normocephalic, without obvious abnormality. Oral mucosa pink and moist. Dentition good.  EYES: Sclerae anicteric.    NECK: Supple.   CARDIOVASCULAR: Regular rate and rhythm. No murmurs.  RESPIRATORY: Normal respiratory effort. BBS CTA. No wheezes or crackles.  GI: Soft, non-tender, non-distended. No hepatosplenomegaly. No masses palpable. No ascites.  EXTREMITIES:  No clubbing, cyanosis or edema.  SKIN: Warm and dry. No jaundice. (+) vitiligo. (+) erythema to forehead with dry skin. (+) telangectasias noted. No palmar erythema.  NEURO:  Normal gate. No asterixis.  PSYCH:  Memory intact. Thought and " speech pattern appropriate. Behavior normal. No depression or anxiety noted.      Labs:  Lab Results   Component Value Date    WBC 4.44 05/21/2018    HGB 16.2 05/21/2018    HCT 47.6 05/21/2018     05/21/2018     05/21/2018    K 5.1 05/21/2018    CREATININE 1.2 05/21/2018    ALT 63 (H) 05/21/2018    AST 39 05/21/2018    ALKPHOS 69 05/21/2018    BILITOT 0.8 05/21/2018    ALBUMIN 4.3 05/21/2018    INR 0.9 05/21/2018    AFP 5.1 05/21/2018     MRI 5/21/18  FINDINGS:  Liver demonstrates multiple subcentimeter enhancing lesions within the right lobe, unchanged or smaller than prior examinations.  No evidence of washout or pseudocapsule formation.  A few of the previously mentioned lesions are not as well demonstrated on today's study.    Spleen, pancreas, adrenal glands and kidneys unremarkable.  Gallbladder is unremarkable.  No biliary dilatation.  Stomach and visualized bowel unremarkable.  No free fluid or adenopathy.    Visualized lower chest and osseous structures unremarkable.   Impression       No detrimental change with previously noted right hepatic lobe enhancing lesions either stable, smaller or not visualized.    No concerning liver lesion present.         ASSESSMENT:  57 y.o. White male with:  1.  Cirrhosis, well compensated  -- MELD = MELD-Na score: 8 at 5/21/2018 10:24 AM  MELD score: 8 at 5/21/2018 10:24 AM  Calculated from:  Serum Creatinine: 1.2 mg/dL at 5/21/2018 10:24 AM  Serum Sodium: 138 mmol/L (Rounded to 137) at 5/21/2018 10:24 AM  Total Bilirubin: 0.8 mg/dL (Rounded to 1) at 5/21/2018 10:24 AM  INR(ratio): 0.9 (Rounded to 1) at 5/21/2018 10:24 AM  Age: 57 years  -- HCC screening- AFP and MRI - up to date, next due 11/2018  -- Immunity to Hep A and B - pt reports vaccines  -- EGD -  5/2016 - no varices  2. History of hepatitis C, s/p successful treatment with Harvoni with SVR 24 10/2016  3. Elevated liver enzymes  -- could be d/t NAFLD since he has gained weight since 2017  -- will  repeat lft's in 3 months and check a few other labs to evaluate  4. Liver masses  --  multiple stable subcentimeter enhancing lesions within the right lobe, no concerning masses for HCC on MRI  -- AFP nl  5. Vitiligo  -- Vitiligo: started 2010 (after dx of HCV). HCV is associated with viligo. Discussed that if returns to work, should be in a job that avoids sun exposure.  -- now has rash, refer to dermatology    EDUCATION:   The disease process and manifestations of cirrhosis were discussed.    Signs and symptoms of hepatic decompensation were reviewed, including jaundice, ascites, and slowed mentation due to hepatic encephalopathy. The patient should seek medical attention if any of these things occur.  We discussed the potential for bleeding from esophageal varices with symptoms of hematemesis and melena. The patient should report to the Emergency Department for these symptoms.    We discussed the increased risk of hepatocellular carcinoma due to cirrhosis. Continued screening every six months with ultrasound and AFP is recommended.     No alcohol or raw seafood.  Tylenol/acetaminophen as needed for pain, up to 2000 mg daily    We discussed the manifestations of NAFLD. At this time there is no FDA approved therapy for NAFLD.   The patient and I discussed the importance of diet, exercise, and weight loss for management of NAFLD. We discussed a low fat, low carb/low sugar, high fiber diet, and a goal of 30 minutes of exercise 5 times per week.   Pt is aware that fatty liver can progress to steatohepatitis and possibly to cirrhosis, putting one at increased risk for liver cancer, liver failure, and death.        PLAN:  1. Labs in 3 months to repeat hepatic panel. Check HCV RNA and hep B also  2. HCC screening Q 6 months with AFP and abd. U/S, next due 11/2018  3. Weight loss with diet and exercise. Low carb, high protein diet  4. Repeat EGD now. Colonoscopy UTD, reports last one in 2015 [facility], no  polyps, repeat 5-10 yrs advised  5. Referral to dermatology  6. F/u with Dr. Vasquez in 11/2018 with MRI and labs      Thank you for allowing me to participate in the care of KAMRAN BrewerC

## 2018-05-21 NOTE — PROGRESS NOTES
I have personally performed a face to face diagnostic evaluation on this patient. I have reviewed and agree with today's findings and the care plan outlined by Ly Daily NP      My findings are as follows:  Patient presents for routine follow up. The patient continues to deny any symptoms of decompensated cirrhosis, including no ascites or edema, cognitive problems that would suggest hepatic encephalopathy, or GI bleeding from varices. Imaging suggests no HCC.      he will return to Ly Daily NP/Dr Vasquez  for follow-up.

## 2018-05-31 ENCOUNTER — TELEPHONE (OUTPATIENT)
Dept: TRANSPLANT | Facility: CLINIC | Age: 58
End: 2018-05-31

## 2018-05-31 NOTE — TELEPHONE ENCOUNTER
Call returned to Uzma Sadler. She will fax a letter to [phone] explaining the information that she needs from Dr. Vasquez in order to move forward settling the pt's claim. Will relay message to Dr. Vasquez when fax is received.    [facility]        ----- Message from Marisel Fuentes sent at 5/30/2018  2:28 PM CDT -----  Contact: John Sadler [phone]  Need add'l information on the pt/asked for Taylor to call her back

## 2018-08-16 DIAGNOSIS — K74.60 HEPATIC CIRRHOSIS, UNSPECIFIED HEPATIC CIRRHOSIS TYPE, UNSPECIFIED WHETHER ASCITES PRESENT: Primary | ICD-10-CM

## 2018-08-22 ENCOUNTER — TELEPHONE (OUTPATIENT)
Dept: HEPATOLOGY | Facility: CLINIC | Age: 58
End: 2018-08-22

## 2018-08-22 NOTE — TELEPHONE ENCOUNTER
----- Message from Toña Clifton sent at 8/22/2018 12:32 PM CDT -----  Good Afternoon,    The [facility] @ [facility] is requesting whether or not Endoscopy scheduled for patient 8/28/18 is related to his work comp claim on file for Hepatology purposes.  Could you please provide this information she is trying to review referral scheduled 8/28/18 to make decision on review.    Thanks  Saurav  [facility]

## 2018-08-23 ENCOUNTER — TELEPHONE (OUTPATIENT)
Dept: HEPATOLOGY | Facility: CLINIC | Age: 58
End: 2018-08-23

## 2018-08-23 NOTE — TELEPHONE ENCOUNTER
Well in that case, I guess it would be covered under workman's comp. I never knew the etiology of his hep C since he was treated and cured once he got to me and did not know he had a workman's comp case about this. Will notify Toña Clifton who sent me the msg yesterday.

## 2018-08-23 NOTE — TELEPHONE ENCOUNTER
Patient called regarding being responsible for the EGD billing which should be under the Workman's Compensation.  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 I was previously negative.  Patient stated I would pass the information on to Ly Daily NP.

## 2018-08-23 NOTE — TELEPHONE ENCOUNTER
----- Message from Marisel Fuentes sent at 8/23/2018  8:14 AM CDT -----  Pt having a problem with billing bc all of his treatment is workman's comp related and  Stated that it was not so therefore pt is being personally financially responsible for this treatment and he should not be    pls contact pt ASAP    Pt contact [phone]

## 2018-08-28 ENCOUNTER — ANESTHESIA (OUTPATIENT)
Dept: ENDOSCOPY | Facility: HOSPITAL | Age: 58
End: 2018-08-28
Payer: MEDICARE

## 2018-08-28 ENCOUNTER — ANESTHESIA EVENT (OUTPATIENT)
Dept: ENDOSCOPY | Facility: HOSPITAL | Age: 58
End: 2018-08-28
Payer: MEDICARE

## 2018-08-28 ENCOUNTER — LAB VISIT (OUTPATIENT)
Dept: LAB | Facility: HOSPITAL | Age: 58
End: 2018-08-28
Attending: INTERNAL MEDICINE
Payer: COMMERCIAL

## 2018-08-28 ENCOUNTER — HOSPITAL ENCOUNTER (OUTPATIENT)
Facility: HOSPITAL | Age: 58
Discharge: HOME | End: 2018-08-28
Attending: INTERNAL MEDICINE | Admitting: INTERNAL MEDICINE
Payer: OTHER MISCELLANEOUS

## 2018-08-28 VITALS
OXYGEN SATURATION: 94 % | WEIGHT: 210 LBS | RESPIRATION RATE: 20 BRPM | BODY MASS INDEX: 26.95 KG/M2 | HEIGHT: 74 IN | SYSTOLIC BLOOD PRESSURE: 106 MMHG | HEART RATE: 60 BPM | TEMPERATURE: 98 F | DIASTOLIC BLOOD PRESSURE: 74 MMHG

## 2018-08-28 DIAGNOSIS — K74.60 CIRRHOSIS: ICD-10-CM

## 2018-08-28 DIAGNOSIS — Z86.19 HISTORY OF HEPATITIS C: Primary | ICD-10-CM

## 2018-08-28 DIAGNOSIS — K74.60 CIRRHOSIS OF LIVER WITHOUT ASCITES, UNSPECIFIED HEPATIC CIRRHOSIS TYPE: ICD-10-CM

## 2018-08-28 DIAGNOSIS — K74.60 HEPATIC CIRRHOSIS, UNSPECIFIED HEPATIC CIRRHOSIS TYPE, UNSPECIFIED WHETHER ASCITES PRESENT: ICD-10-CM

## 2018-08-28 LAB
BASOPHILS # BLD AUTO: 0.05 K/UL
BASOPHILS NFR BLD: 0.8 %
DIFFERENTIAL METHOD: NORMAL
EOSINOPHIL # BLD AUTO: 0.2 K/UL
EOSINOPHIL NFR BLD: 3.6 %
ERYTHROCYTE [DISTWIDTH] IN BLOOD BY AUTOMATED COUNT: 12.3 %
HCT VFR BLD AUTO: 43.5 %
HGB BLD-MCNC: 14.7 G/DL
IMM GRANULOCYTES # BLD AUTO: 0.02 K/UL
IMM GRANULOCYTES NFR BLD AUTO: 0.3 %
INR PPP: 0.9
LYMPHOCYTES # BLD AUTO: 2.2 K/UL
LYMPHOCYTES NFR BLD: 37.5 %
MCH RBC QN AUTO: 29.1 PG
MCHC RBC AUTO-ENTMCNC: 33.8 G/DL
MCV RBC AUTO: 86 FL
MONOCYTES # BLD AUTO: 0.6 K/UL
MONOCYTES NFR BLD: 9.3 %
NEUTROPHILS # BLD AUTO: 2.9 K/UL
NEUTROPHILS NFR BLD: 48.5 %
NRBC BLD-RTO: 0 /100 WBC
PLATELET # BLD AUTO: 204 K/UL
PMV BLD AUTO: 9.2 FL
PROTHROMBIN TIME: 9.8 SEC
RBC # BLD AUTO: 5.06 M/UL
WBC # BLD AUTO: 5.89 K/UL

## 2018-08-28 PROCEDURE — 63600175 PHARM REV CODE 636 W HCPCS: Performed by: NURSE ANESTHETIST, CERTIFIED REGISTERED

## 2018-08-28 PROCEDURE — E9220 PRA ENDO ANESTHESIA: HCPCS | Mod: ,,, | Performed by: NURSE ANESTHETIST, CERTIFIED REGISTERED

## 2018-08-28 PROCEDURE — C1726 CATH, BAL DIL, NON-VASCULAR: HCPCS | Performed by: INTERNAL MEDICINE

## 2018-08-28 PROCEDURE — 37000009 HC ANESTHESIA EA ADD 15 MINS: Performed by: INTERNAL MEDICINE

## 2018-08-28 PROCEDURE — 85025 COMPLETE CBC W/AUTO DIFF WBC: CPT

## 2018-08-28 PROCEDURE — 25000003 PHARM REV CODE 250: Performed by: INTERNAL MEDICINE

## 2018-08-28 PROCEDURE — 37000008 HC ANESTHESIA 1ST 15 MINUTES: Performed by: INTERNAL MEDICINE

## 2018-08-28 PROCEDURE — 25000003 PHARM REV CODE 250: Performed by: NURSE ANESTHETIST, CERTIFIED REGISTERED

## 2018-08-28 PROCEDURE — 36415 COLL VENOUS BLD VENIPUNCTURE: CPT

## 2018-08-28 PROCEDURE — 85610 PROTHROMBIN TIME: CPT

## 2018-08-28 PROCEDURE — 43249 PR EGD, FLEX, W/BALL DILATION, < 30MM: ICD-10-PCS | Mod: ,,, | Performed by: INTERNAL MEDICINE

## 2018-08-28 PROCEDURE — 43249 ESOPH EGD DILATION <30 MM: CPT | Mod: ,,, | Performed by: INTERNAL MEDICINE

## 2018-08-28 PROCEDURE — 43249 ESOPH EGD DILATION <30 MM: CPT | Performed by: INTERNAL MEDICINE

## 2018-08-28 RX ORDER — PROPOFOL 10 MG/ML
VIAL (ML) INTRAVENOUS
Status: DISCONTINUED | OUTPATIENT
Start: 2018-08-28 | End: 2018-08-28

## 2018-08-28 RX ORDER — LIDOCAINE HCL/PF 100 MG/5ML
SYRINGE (ML) INTRAVENOUS
Status: DISCONTINUED | OUTPATIENT
Start: 2018-08-28 | End: 2018-08-28

## 2018-08-28 RX ORDER — PROPOFOL 10 MG/ML
VIAL (ML) INTRAVENOUS CONTINUOUS PRN
Status: DISCONTINUED | OUTPATIENT
Start: 2018-08-28 | End: 2018-08-28

## 2018-08-28 RX ORDER — SODIUM CHLORIDE 0.9 % (FLUSH) 0.9 %
3 SYRINGE (ML) INJECTION
Status: ACTIVE | OUTPATIENT
Start: 2018-08-28

## 2018-08-28 RX ORDER — SODIUM CHLORIDE 9 MG/ML
INJECTION, SOLUTION INTRAVENOUS CONTINUOUS
Status: ACTIVE | OUTPATIENT
Start: 2018-08-28

## 2018-08-28 RX ADMIN — LIDOCAINE HYDROCHLORIDE 100 MG: 20 INJECTION, SOLUTION INTRAVENOUS at 08:08

## 2018-08-28 RX ADMIN — PROPOFOL 200 MCG/KG/MIN: 10 INJECTION, EMULSION INTRAVENOUS at 08:08

## 2018-08-28 RX ADMIN — SODIUM CHLORIDE: 0.9 INJECTION, SOLUTION INTRAVENOUS at 08:08

## 2018-08-28 RX ADMIN — PROPOFOL 70 MG: 10 INJECTION, EMULSION INTRAVENOUS at 08:08

## 2018-08-28 NOTE — PLAN OF CARE
Patient ready for discharge. Discharge instructions reviewed with patient and family. Understanding verbalized.

## 2018-08-28 NOTE — TRANSFER OF CARE
"Anesthesia Transfer of Care Note    Patient: Phliip Presley    Procedure(s) Performed: Procedure(s) (LRB):  ESOPHAGOGASTRODUODENOSCOPY (EGD) (N/A)    Patient location: GI    Anesthesia Type: general    Transport from OR: Transported from OR on room air with adequate spontaneous ventilation    Post pain: adequate analgesia    Post assessment: no apparent anesthetic complications and tolerated procedure well    Post vital signs: stable    Level of consciousness: sedated    Nausea/Vomiting: no nausea/vomiting    Complications: none    Transfer of care protocol was followed      Last vitals:   Visit Vitals  /69   Pulse 73   Temp 36.5 °C (97.7 °F)   Resp 20   Ht 6' 2" (1.88 m)   Wt 95.3 kg (210 lb)   SpO2 97%   BMI 26.96 kg/m²     "

## 2018-08-28 NOTE — H&P
Short Stay Endoscopy History and Physical    PCP - Primary Doctor No     Procedure - EGD  ASA - per anesthesia  Mallampati - per anesthesia  History of Anesthesia problems - no  Family history Anesthesia problems -  no   Plan of anesthesia - General    HPI:  This is a 57 y.o. male here for evaluation of :       Varices surveillance  Had a mild schatzki ring last time with relief after dilation  Is having some intermittent dysphagia    ROS:  Constitutional: No fevers, chills, No weight loss  CV: No chest pain  Pulm: No cough, No shortness of breath  Ophtho: No vision changes  GI: see HPI  Derm: No rash    Medical History:  has a past medical history of Cirrhosis, Fibromyalgia, History of hepatitis C (10/28/2015), and Vitiligo (11/19/2017).    Surgical History:  has a past surgical history that includes Hand surgery; Nose surgery; Upper gastrointestinal endoscopy; Colonoscopy (2016); and ESOPHAGOGASTRODUODENOSCOPY (EGD) (N/A, 5/2/2016).    Family History: family history includes COPD in his father; Cancer in his father and maternal grandfather.. Otherwise no colon cancer, inflammatory bowel disease, or GI malignancies.    Social History:  reports that  has never smoked. he has never used smokeless tobacco. He reports that he does not drink alcohol or use drugs.    Review of patient's allergies indicates:   Allergen Reactions    Penicillins Hives     Reaction to it when pt was a baby.       Medications:   Medications Prior to Admission   Medication Sig Dispense Refill Last Dose    chlorzoxazone (PARAFON FORTE) 500 mg Tab Take 500 mg by mouth.  2 8/27/2018 at Unknown time    diclofenac (VOLTAREN) 75 MG EC tablet Take 75 mg by mouth once daily.  4 8/27/2018 at Unknown time    LYRICA 150 mg capsule Take 150 capsules by mouth 2 (two) times daily.   2 8/27/2018 at Unknown time    tramadol (ULTRAM) 50 mg tablet Take 100 mg by mouth every 4 (four) hours as needed.   2 8/27/2018 at Unknown time    zolpidem (AMBIEN  CR) 12.5 MG CR tablet Take 12.5 mg by mouth nightly as needed.   2 8/27/2018 at Unknown time       Physical Exam:    Vital Signs:   Vitals:    08/28/18 0816   BP: 121/78   Pulse: 61   Resp: 18   Temp: 97.9 °F (36.6 °C)       General Appearance: Well appearing in no acute distress  Eyes:    No scleral icterus  ENT: Neck supple, Lips, mucosa, and tongue normal; teeth and gums normal  Lungs: CTA anteriorly  Heart:  Regular rate, S1, S2 normal, no murmurs heard.  Abdomen: Soft, non tender, non distended with normal bowel sounds. No hepatosplenomegaly, ascites, or mass.  Extremities: No edema  Skin: No rash    Labs:  Lab Results   Component Value Date    WBC 5.89 08/28/2018    HGB 14.7 08/28/2018    HCT 43.5 08/28/2018     08/28/2018    ALT 34 08/22/2018    AST 30 08/22/2018     05/21/2018    K 5.1 05/21/2018     05/21/2018    CREATININE 1.2 05/21/2018    BUN 18 05/21/2018    CO2 26 05/21/2018    INR 0.9 08/28/2018       I have explained the risks and benefits of endoscopy procedures to the patient including but not limited to bleeding, perforation, infection, and death.      Wilbur Bee MD

## 2018-08-28 NOTE — DISCHARGE INSTRUCTIONS

## 2018-08-28 NOTE — ANESTHESIA PREPROCEDURE EVALUATION
08/28/2018  Philip Presley is a 57 y.o., male.    Past Medical History:   Diagnosis Date    Cirrhosis     Fibromyalgia     History of hepatitis C 10/28/2015    Treated with Harvoni x 24 wks, SVR 24 10/2016     Vitiligo 11/19/2017     Past Surgical History:   Procedure Laterality Date    HAND SURGERY      NOSE SURGERY      UPPER GASTROINTESTINAL ENDOSCOPY         Anesthesia Evaluation    I have reviewed the Patient Summary Reports.    I have reviewed the Nursing Notes.      Review of Systems  Anesthesia Hx:  No problems with previous Anesthesia  Neg history of prior surgery. Denies Family Hx of Anesthesia complications.   Denies Personal Hx of Anesthesia complications.   Cardiovascular:   Exercise tolerance: good    Hepatic/GI:   Liver Disease, Hepatitis        Physical Exam  General:  Well nourished    Airway/Jaw/Neck:  AIRWAY FINDINGS: Normal      Eyes/Ears/Nose:  EYES/EARS/NOSE FINDINGS: Normal   Dental:  DENTAL FINDINGS: Normal   Chest/Lungs:  Chest/Lungs Clear    Heart/Vascular:  Heart Findings: Normal       Mental Status:  Mental Status Findings: Normal        Anesthesia Plan  Type of Anesthesia, risks & benefits discussed:  Anesthesia Type:  general  Patient's Preference: general  Intra-op Monitoring Plan: standard ASA monitors  Intra-op Monitoring Plan Comments:   Post Op Pain Control Plan:   Post Op Pain Control Plan Comments:   Induction:   IV  Beta Blocker:  Patient is not currently on a Beta-Blocker (No further documentation required).       Informed Consent: Patient understands risks and agrees with Anesthesia plan.  Questions answered. Anesthesia consent signed with patient.  ASA Score: 2     Day of Surgery Review of History & Physical:    H&P update referred to the provider.         Ready For Surgery From Anesthesia Perspective.

## 2018-08-28 NOTE — ANESTHESIA POSTPROCEDURE EVALUATION
"Anesthesia Post Evaluation    Patient: Philip Presley    Procedure(s) Performed: Procedure(s) (LRB):  ESOPHAGOGASTRODUODENOSCOPY (EGD) (N/A)    Final Anesthesia Type: general  Patient location during evaluation: PACU  Patient participation: Yes- Able to Participate  Level of consciousness: awake and alert  Post-procedure vital signs: reviewed and stable  Pain management: adequate  Airway patency: patent  PONV status at discharge: No PONV  Anesthetic complications: no      Cardiovascular status: blood pressure returned to baseline  Respiratory status: unassisted  Hydration status: euvolemic  Follow-up not needed.        Visit Vitals  /69   Pulse 73   Temp 36.5 °C (97.7 °F)   Resp 20   Ht 6' 2" (1.88 m)   Wt 95.3 kg (210 lb)   SpO2 97%   BMI 26.96 kg/m²       Pain/Mikel Score: Pain Assessment Performed: Yes (8/28/2018  8:50 AM)  Presence of Pain: non-verbal indicators absent (8/28/2018  8:50 AM)  Mikel Score: 9 (8/28/2018  8:50 AM)        "

## 2018-08-28 NOTE — [employer] PATIENT INSTRUCTIONS
Discharge Summary/Instructions after an Endoscopic Procedure  Patient Name: Philip Presley  Patient MRN: [id number]  Patient YOB: 1960 Tuesday, August 28, 2018  Wilbur Bee MD  RESTRICTIONS:  During your procedure today, you received medications for sedation.  These   medications may affect your judgment, balance and coordination.  Therefore,   for 24 hours, you have the following restrictions:   - DO NOT drive a car, operate machinery, make legal/financial decisions,   sign important papers or drink alcohol.    ACTIVITY:  Today: no heavy lifting, straining or running due to procedural   sedation/anesthesia.  The following day: return to full activity including work.  DIET:  Eat and drink normally unless instructed otherwise.     TREATMENT FOR COMMON SIDE EFFECTS:  - Mild abdominal pain, nausea, belching, bloating or excessive gas:  rest,   eat lightly and use a heating pad.  - Sore Throat: treat with throat lozenges and/or gargle with warm salt   water.  - Because air was used during the procedure, expelling large amounts of air   from your rectum or belching is normal.  - If a bowel prep was taken, you may not have a bowel movement for 1-3 days.    This is normal.  SYMPTOMS TO WATCH FOR AND REPORT TO YOUR PHYSICIAN:  1. Abdominal pain or bloating, other than gas cramps.  2. Chest pain.  3. Back pain.  4. Signs of infection such as: chills or fever occurring within 24 hours   after the procedure.  5. Rectal bleeding, which would show as bright red, maroon, or black stools.   (A tablespoon of blood from the rectum is not serious, especially if   hemorrhoids are present.)  6. Vomiting.  7. Weakness or dizziness.  GO DIRECTLY TO THE NEAREST EMERGENCY ROOM IF YOU HAVE ANY OF THE FOLLOWING:      Difficulty breathing              Chills and/or fever over 101 F   Persistent vomiting and/or vomiting blood   Severe abdominal pain   Severe chest pain   Black, tarry stools   Bleeding- more than one tablespoon   Any  other symptom or condition that you feel may need urgent attention  Your doctor recommends these additional instructions:  If any biopsies were taken, your doctors clinic will contact you in 1 to 2   weeks with any results.  - Patient has a contact number available for emergencies.  The signs and   symptoms of potential delayed complications were discussed with the   patient.  Return to normal activities tomorrow.  Written discharge   instructions were provided to the patient.   - Discharge patient to home.   - Repeat upper endoscopy in 2 years for surveillance.   - The findings and recommendations were discussed with the designated   responsible adult.   - Return to nurse practitioner as previously scheduled.  For questions, problems or results please call your physician - Wilbur Bee MD at Work:  [phone].  [facility], EMERGENCY ROOM PHONE NUMBER: [phone]  IF A COMPLICATION OR EMERGENCY SITUATION ARISES AND YOU ARE UNABLE TO REACH   YOUR PHYSICIAN - GO DIRECTLY TO THE EMERGENCY ROOM.  Wilbur Bee MD  8/28/2018 8:47:41 AM  This report has been verified and signed electronically.  [employer]

## 2018-10-22 ENCOUNTER — TELEPHONE (OUTPATIENT)
Dept: TRANSPLANT | Facility: CLINIC | Age: 58
End: 2018-10-22

## 2018-10-22 ENCOUNTER — TELEPHONE (OUTPATIENT)
Dept: HEPATOLOGY | Facility: CLINIC | Age: 58
End: 2018-10-22

## 2018-10-22 DIAGNOSIS — K74.60 HEPATIC CIRRHOSIS, UNSPECIFIED HEPATIC CIRRHOSIS TYPE, UNSPECIFIED WHETHER ASCITES PRESENT: Primary | ICD-10-CM

## 2018-11-20 ENCOUNTER — OFFICE VISIT (OUTPATIENT)
Dept: HEPATOLOGY | Facility: CLINIC | Age: 58
End: 2018-11-20
Payer: OTHER MISCELLANEOUS

## 2018-11-20 VITALS
TEMPERATURE: 98 F | DIASTOLIC BLOOD PRESSURE: 80 MMHG | OXYGEN SATURATION: 99 % | HEIGHT: 74 IN | WEIGHT: 215.19 LBS | RESPIRATION RATE: 17 BRPM | HEART RATE: 59 BPM | BODY MASS INDEX: 27.62 KG/M2 | SYSTOLIC BLOOD PRESSURE: 111 MMHG

## 2018-11-20 DIAGNOSIS — K76.9 LIVER LESION: ICD-10-CM

## 2018-11-20 DIAGNOSIS — K74.60 CIRRHOSIS OF LIVER WITHOUT ASCITES, UNSPECIFIED HEPATIC CIRRHOSIS TYPE: Primary | ICD-10-CM

## 2018-11-20 DIAGNOSIS — Z86.19 HISTORY OF HEPATITIS C: ICD-10-CM

## 2018-11-20 DIAGNOSIS — L80 VITILIGO: ICD-10-CM

## 2018-11-20 PROCEDURE — 99999 PR PBB SHADOW E&M-EST. PATIENT-LVL IV: CPT | Mod: PBBFAC,,, | Performed by: INTERNAL MEDICINE

## 2018-11-20 PROCEDURE — 3008F BODY MASS INDEX DOCD: CPT | Mod: CPTII,S$GLB,, | Performed by: INTERNAL MEDICINE

## 2018-11-20 PROCEDURE — 99214 OFFICE O/P EST MOD 30 MIN: CPT | Mod: PBBFAC,PN | Performed by: INTERNAL MEDICINE

## 2018-11-20 PROCEDURE — 99215 OFFICE O/P EST HI 40 MIN: CPT | Mod: S$GLB,,, | Performed by: INTERNAL MEDICINE

## 2018-11-20 NOTE — PROGRESS NOTES
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    HPI   Philip Presley has previously been treated with harvoni and achieved an SVR12 HCV VL last checked today 11/20/18 and was still negative; hx of liver biopsy, F3. His liver disease is complicated by thrombocytopenia, mildly elevated LFTs (ALT 36). The low plts would suggest cirrhosis. Platelets today are improved to 208. The patient denies any symptoms of decompensated cirrhosis, including no ascites or edema, cognitive problems that would suggest hepatic encephalopathy, or GI bleeding from varices. No varices on EGD 05/16.    He is feeling well. He denies N/V, abdominal pain or diarrhea.     Liver function is normal: Tbil 0.8, albumin 4.8, creat 1.1, Na 139, INR 1.0.    MRI 11/20/18:  Redemonstration of subcentimeter enhancing hepatic lesions.  Essentially unchanged since previous exam.  Lesions are isointense on precontrast.  Liver margin is smooth. AFP 5.3 11/20/18. No obvious hepatoma.    Outpatient Encounter Medications as of 11/20/2018   Medication Sig Dispense Refill    chlorzoxazone (PARAFON FORTE) 500 mg Tab Take 500 mg by mouth.  2    diclofenac (VOLTAREN) 75 MG EC tablet Take 75 mg by mouth once daily.  4    LYRICA 150 mg capsule Take 150 capsules by mouth 2 (two) times daily.   2    tramadol (ULTRAM) 50 mg tablet Take 100 mg by mouth every 4 (four) hours as needed.   2    zolpidem (AMBIEN CR) 12.5 MG CR tablet Take 12.5 mg by mouth nightly as needed.   2     Facility-Administered Encounter Medications as of 11/20/2018   Medication Dose Route Frequency Provider Last Rate Last Dose    0.9%  NaCl infusion   Intravenous Continuous Wilbur Bee MD   Stopped at 08/28/18 0844    [COMPLETED] gadobutrol 10 mL  10 mL Intravenous ONCE PRN Gem Vasquez MD    10 mL at 11/20/18 1300    sodium chloride 0.9% flush 3 mL  3 mL Intravenous PRN Wilbur Bee MD         Review of patient's allergies indicates:   Allergen Reactions    Penicillins Hives     Reaction to it when pt was a baby.     Past Medical History:   Diagnosis Date    Cirrhosis     Fibromyalgia     History of hepatitis C 10/28/2015    Treated with Harvoni x 24 wks, SVR 24 10/2016     Vitiligo 11/19/2017       Review of Systems   Constitutional: Negative.    HENT: Negative.    Eyes: Negative.    Respiratory: Negative.    Cardiovascular: Negative.    Gastrointestinal: Negative.    Genitourinary: Negative.    Musculoskeletal: Negative.    Skin: Negative.    Neurological: Negative.    Psychiatric/Behavioral: Negative.      Vitals:    11/20/18 1343   BP: 111/80   Pulse: (!) 59   Resp: 17   Temp: 98 °F (36.7 °C)       Physical Exam   Constitutional: He is oriented to person, place, and time. He appears well-developed and well-nourished.   HENT:   Head: Normocephalic and atraumatic.   Eyes: Conjunctivae and EOM are normal. Pupils are equal, round, and reactive to light. No scleral icterus.   Neck: Normal range of motion. Neck supple. No thyromegaly present.   Cardiovascular: Normal rate, regular rhythm and normal heart sounds.   Pulmonary/Chest: Effort normal and breath sounds normal. He has no rales.   Abdominal: Soft. Bowel sounds are normal. He exhibits no distension and no mass. There is no tenderness.   Musculoskeletal: Normal range of motion. He exhibits no edema.   Neurological: He is alert and oriented to person, place, and time.   Skin: Skin is warm and dry. No rash noted.   +vitiligo   Psychiatric: He has a normal mood and affect.   Vitals reviewed.    MELD-Na score: 7 at 11/20/2018  1:26 PM  MELD score: 7 at 11/20/2018  1:26 PM  Calculated from:  Serum Creatinine: 1.1 mg/dL at 11/20/2018  1:26 PM  Serum Sodium: 139 mmol/L (Rounded to 137 mmol/L) at 11/20/2018  1:26 PM  Total Bilirubin: 0.8 mg/dL (Rounded  to 1 mg/dL) at 11/20/2018  1:26 PM  INR(ratio): 1 at 11/20/2018  1:26 PM  Age: 58 years      Lab Results   Component Value Date    GLU 88 05/21/2018    BUN 18 05/21/2018    CREATININE 1.2 05/21/2018    CALCIUM 9.7 05/21/2018     05/21/2018    K 5.1 05/21/2018     05/21/2018    PROT 7.1 08/22/2018    CO2 26 05/21/2018    CO2 31 (H) 10/17/2016    ANIONGAP 8 05/21/2018    WBC 5.89 08/28/2018    RBC 5.06 08/28/2018    HGB 14.7 08/28/2018    HCT 43.5 08/28/2018    MCV 86 08/28/2018    MCH 29.1 08/28/2018    MCHC 33.8 08/28/2018     Lab Results   Component Value Date    RDW 12.3 08/28/2018     08/28/2018    MPV 9.2 08/28/2018    GRAN 2.9 08/28/2018    GRAN 48.5 08/28/2018    LYMPH 2.2 08/28/2018    LYMPH 37.5 08/28/2018    MONO 0.6 08/28/2018    MONO 9.3 08/28/2018    EOSINOPHIL 3.6 08/28/2018    BASOPHIL 0.8 08/28/2018    EOS 0.2 08/28/2018    BASO 0.05 08/28/2018    ALBUMIN 4.2 08/22/2018    BILIDIR 0.1 08/22/2018    AST 30 08/22/2018    AST 36 10/17/2016    ALT 34 08/22/2018    ALT 34 10/17/2016    ALKPHOS 60 08/22/2018    LABPROT 9.8 08/28/2018    INR 0.9 08/28/2018       Assessment and Plan:    Philip Presley is a 58 y.o. male with HCV-induced Cirrhosis  My current recommendations:  1. Compensated cirrhosis: MELD 7: remains medically early for liver transplant. Check meld labs every 6 months. Hepatic lesions, multiple, stable: US in 6 months  3. HCV SVR: has attained an SVR  4. EGD for variceal screening: repeat in 2018/19.  5. Vitiligo: started 2010 (after dx of HCV). HCV is associated with viligo.   Return 6 months    . . .

## 2018-11-20 NOTE — PATIENT INSTRUCTIONS
1. I will review your labs and MRI when they are resulted. Will pay special attention to the kidney function  Repeat labs in 6 months. Either US or MRI in 6 months  Return 6 months

## 2018-12-13 ENCOUNTER — TELEPHONE (OUTPATIENT)
Dept: PRIMARY CARE CLINIC | Facility: CLINIC | Age: 58
End: 2018-12-13

## 2018-12-13 ENCOUNTER — OFFICE VISIT (OUTPATIENT)
Dept: PRIMARY CARE CLINIC | Facility: CLINIC | Age: 58
End: 2018-12-13
Payer: COMMERCIAL

## 2018-12-13 VITALS
DIASTOLIC BLOOD PRESSURE: 83 MMHG | SYSTOLIC BLOOD PRESSURE: 122 MMHG | HEIGHT: 72 IN | BODY MASS INDEX: 29.78 KG/M2 | TEMPERATURE: 98 F | WEIGHT: 219.88 LBS | RESPIRATION RATE: 18 BRPM | OXYGEN SATURATION: 57 % | HEART RATE: 57 BPM

## 2018-12-13 DIAGNOSIS — K76.9 LIVER LESION: ICD-10-CM

## 2018-12-13 DIAGNOSIS — J32.9 SINUSITIS, UNSPECIFIED CHRONICITY, UNSPECIFIED LOCATION: Primary | ICD-10-CM

## 2018-12-13 DIAGNOSIS — Z86.19 HISTORY OF HEPATITIS C: ICD-10-CM

## 2018-12-13 DIAGNOSIS — L80 VITILIGO: ICD-10-CM

## 2018-12-13 DIAGNOSIS — K74.60 CIRRHOSIS OF LIVER WITHOUT ASCITES, UNSPECIFIED HEPATIC CIRRHOSIS TYPE: ICD-10-CM

## 2018-12-13 PROCEDURE — 3008F BODY MASS INDEX DOCD: CPT | Mod: CPTII,S$GLB,, | Performed by: FAMILY MEDICINE

## 2018-12-13 PROCEDURE — 96372 THER/PROPH/DIAG INJ SC/IM: CPT | Mod: 59,S$GLB,, | Performed by: FAMILY MEDICINE

## 2018-12-13 PROCEDURE — 99213 OFFICE O/P EST LOW 20 MIN: CPT | Mod: 25,S$GLB,, | Performed by: FAMILY MEDICINE

## 2018-12-13 PROCEDURE — 99999 PR PBB SHADOW E&M-EST. PATIENT-LVL III: CPT | Mod: PBBFAC,,, | Performed by: FAMILY MEDICINE

## 2018-12-13 RX ORDER — METHYLPREDNISOLONE 4 MG/1
TABLET ORAL
Qty: 1 PACKAGE | Refills: 0 | Status: SHIPPED | OUTPATIENT
Start: 2018-12-13 | End: 2019-05-21 | Stop reason: ALTCHOICE

## 2018-12-13 RX ORDER — METHYLPREDNISOLONE 4 MG/1
TABLET ORAL
Qty: 1 PACKAGE | Refills: 0 | Status: SHIPPED | OUTPATIENT
Start: 2018-12-13 | End: 2018-12-13 | Stop reason: SDUPTHER

## 2018-12-13 RX ORDER — LORATADINE 10 MG/1
10 TABLET ORAL DAILY
Refills: 0 | COMMUNITY
Start: 2018-12-13 | End: 2019-05-21 | Stop reason: ALTCHOICE

## 2018-12-13 RX ORDER — AZITHROMYCIN 250 MG/1
TABLET, FILM COATED ORAL
Qty: 6 TABLET | Refills: 0 | Status: SHIPPED | OUTPATIENT
Start: 2018-12-13 | End: 2018-12-13 | Stop reason: SDUPTHER

## 2018-12-13 RX ORDER — FLUTICASONE PROPIONATE 50 MCG
2 SPRAY, SUSPENSION (ML) NASAL DAILY
Qty: 1 BOTTLE | Refills: 5 | Status: SHIPPED | OUTPATIENT
Start: 2018-12-13 | End: 2019-05-21 | Stop reason: ALTCHOICE

## 2018-12-13 RX ORDER — AZITHROMYCIN 250 MG/1
TABLET, FILM COATED ORAL
Qty: 6 TABLET | Refills: 0 | Status: SHIPPED | OUTPATIENT
Start: 2018-12-13 | End: 2018-12-17

## 2018-12-13 RX ORDER — BETAMETHASONE SODIUM PHOSPHATE AND BETAMETHASONE ACETATE 3; 3 MG/ML; MG/ML
12 INJECTION, SUSPENSION INTRA-ARTICULAR; INTRALESIONAL; INTRAMUSCULAR; SOFT TISSUE
Status: COMPLETED | OUTPATIENT
Start: 2018-12-13 | End: 2018-12-13

## 2018-12-13 RX ORDER — FLUTICASONE PROPIONATE 50 MCG
2 SPRAY, SUSPENSION (ML) NASAL DAILY
Qty: 1 BOTTLE | Refills: 5 | Status: SHIPPED | OUTPATIENT
Start: 2018-12-13 | End: 2018-12-13 | Stop reason: SDUPTHER

## 2018-12-13 RX ADMIN — BETAMETHASONE SODIUM PHOSPHATE AND BETAMETHASONE ACETATE 12 MG: 3; 3 INJECTION, SUSPENSION INTRA-ARTICULAR; INTRALESIONAL; INTRAMUSCULAR; SOFT TISSUE at 02:12

## 2018-12-13 NOTE — PROGRESS NOTES
Subjective:       Patient ID: Philip Presley is a 58 y.o. male.    Chief Complaint: Sinusitis    HPI: 59 yo WM-- patient complaining of headache and sinus problems--started 1 week ago--no fever, +runny nose, +sore throat, no cough.  No pneumonia asthma TB.  No smoking    ROS:  Skin: no psoriasis, eczema, skin cancer +history vitiligo  HEENT: No headache, ocular pain, blurred vision, diplopia, +epistaxis--3 days ago left side only,no  hoarseness change in voice, thyroid trouble  Lung: No pneumonia, asthma, Tb, wheezing, SOB, no smoking  Heart: No chest pain, ankle edema, palpitations, MI, jr murmur, hypertension, hyperlipidemia  Abdomen: No nausea, vomiting, diarrhea, constipation, ulcers, hepatitis, gallbladder disease, melena, hematochezia, hematemesis--history cirrhosis in the past and hepatitis C was treated  : no UTI, renal disease, stones , prostate  MS: no fractures, O/A, lupus, rheumatoid, gout history fibromyalgia  Neuro: + occas  Dizziness--last few days,no  LOC, seizures   No diabetes, no anemia, no anxiety, no depression   Single , 3 children boys, work retired was , lives alone       Objective:   Physical Exam:  General: Well nourished, well developed, no acute distress  Skin:  Some mild hypopigmentation dorsum of the hand forearms bilaterally--does not appear classic vitiligo but patient was diagnosed with this in the past  HEENT: Eyes PERRLA, EOM intact, nose +clear discharge--scab in the left nasal septal area--+ septal deviation to the right so right nostril much smaller than left, throat non-erythematous   NECK: Supple, no bruits, No JVD, no nodes  Lungs: Clear, no rales, rhonchi, wheezing  Heart: Regular rate and rhythm, no murmurs, gallops, or rubs  Abdomen: flat, bowel sounds positive, no tenderness, or organomegaly  MS: Range of motion and muscle strength intact  Neuro: Alert, CN intact, oriented X 3  Extremities: No cyanosis, clubbing, or edema         Assessment:        1. Sinusitis, unspecified chronicity, unspecified location    2. Cirrhosis of liver without ascites, unspecified hepatic cirrhosis type    3. Liver lesion    4. History of hepatitis C    5. Vitiligo        Plan:       Sinusitis, unspecified chronicity, unspecified location  -     Ambulatory referral to ENT    Cirrhosis of liver without ascites, unspecified hepatic cirrhosis type    Liver lesion    History of hepatitis C    Vitiligo    Other orders  -     betamethasone acetate-betamethasone sodium phosphate injection 12 mg  -     methylPREDNISolone (MEDROL DOSEPACK) 4 mg tablet; use as directed  Dispense: 1 Package; Refill: 0  -     loratadine (CLARITIN) 10 mg tablet; Take 1 tablet (10 mg total) by mouth once daily.; Refill: 0  -     fluticasone (FLONASE) 50 mcg/actuation nasal spray; 2 sprays (100 mcg total) by Each Nare route once daily.  Dispense: 1 Bottle; Refill: 5  -     azithromycin (Z-CHELI) 250 MG tablet; 2 tabs by mouth day 1, then 1 tab by mouth daily x 4 days  Dispense: 6 tablet; Refill: 0      review of labs shows negative alpha fetoprotein--hepatitis C quantitation negative--patient states has cirrhosis and had a liver lesion being followed by GI  Patient was labile with vitiligo--not sure the decreased pigmentation of the dorsum of the hand forearms bilaterally is secondary to that  Patient allergic to penicillin--treat with Zithromax/Celestone/Medrol/Claritan-had Flonase if needed--due to septal deviation/nose brings see  ENT see if needs surgery   Keep appointment with liver MD due to history of cirrhosis/liver lesion/hepatitis C-treated  Pt did have nose surgery 3 yrs ago

## 2018-12-13 NOTE — TELEPHONE ENCOUNTER
----- Message from Maria Elena Olmstead sent at 12/13/2018  3:22 PM CST -----  Type:  RX Refill Request    Who Called:  Patient  Refill or New Rx:  refill  RX Name and Strength:    fluticasone (FLONASE) 50 mcg/actuation nasal spray  azithromycin (Z-CHELI) 250 MG tablet  loratadine (CLARITIN) 10 mg tablet  methylPREDNISolone (MEDROL DOSEPACK) 4 mg tablet  How is the patient currently taking it? (ex. 1XDay):  Na  Is this a 30 day or 90 day RX:  30  Preferred Pharmacy with phone number:   [id number]  [address] [address] - [address] AT [facility] [address]  [address] [address]  Phone: [phone] Fax: [phone]  Local or Mail Order:  local  Ordering Provider:  Candi Rodriguez Call Back Number:  [phone] (home)     Additional Information:  Sent Rx to wrong pharmacy, please resubmit to [facility]

## 2019-04-04 ENCOUNTER — TELEPHONE (OUTPATIENT)
Dept: HEPATOLOGY | Facility: CLINIC | Age: 59
End: 2019-04-04

## 2019-04-04 NOTE — TELEPHONE ENCOUNTER
----- Message from Cassia Lipscomb sent at 4/3/2019  3:51 PM CDT -----  Contact: Patient   Needs Advice    Reason for call: Called to schedule 6 month f/u appt. (end of May, afternoon).        Communication Preference: [phone]    Additional Information: n/a

## 2019-04-04 NOTE — TELEPHONE ENCOUNTER
Appt scheduled for 5/21. Attempted to contact pt by phone. No answer.     Appt slip printed and mailed.     SCC

## 2019-05-15 ENCOUNTER — TELEPHONE (OUTPATIENT)
Dept: TRANSPLANT | Facility: CLINIC | Age: 59
End: 2019-05-15

## 2019-05-15 DIAGNOSIS — K74.60 HEPATIC CIRRHOSIS, UNSPECIFIED HEPATIC CIRRHOSIS TYPE, UNSPECIFIED WHETHER ASCITES PRESENT: Primary | ICD-10-CM

## 2019-05-21 ENCOUNTER — OFFICE VISIT (OUTPATIENT)
Dept: HEPATOLOGY | Facility: CLINIC | Age: 59
End: 2019-05-21
Payer: OTHER MISCELLANEOUS

## 2019-05-21 VITALS
RESPIRATION RATE: 18 BRPM | DIASTOLIC BLOOD PRESSURE: 77 MMHG | HEIGHT: 73 IN | BODY MASS INDEX: 28.2 KG/M2 | OXYGEN SATURATION: 95 % | HEART RATE: 65 BPM | SYSTOLIC BLOOD PRESSURE: 104 MMHG | WEIGHT: 212.75 LBS | TEMPERATURE: 98 F

## 2019-05-21 DIAGNOSIS — K74.60 HEPATIC CIRRHOSIS, UNSPECIFIED HEPATIC CIRRHOSIS TYPE, UNSPECIFIED WHETHER ASCITES PRESENT: Primary | ICD-10-CM

## 2019-05-21 DIAGNOSIS — Z86.19 HISTORY OF HEPATITIS C: ICD-10-CM

## 2019-05-21 PROCEDURE — 99999 PR PBB SHADOW E&M-EST. PATIENT-LVL III: ICD-10-PCS | Mod: PBBFAC,,, | Performed by: INTERNAL MEDICINE

## 2019-05-21 PROCEDURE — 3008F PR BODY MASS INDEX (BMI) DOCUMENTED: ICD-10-PCS | Mod: CPTII,S$GLB,, | Performed by: INTERNAL MEDICINE

## 2019-05-21 PROCEDURE — 99213 PR OFFICE/OUTPT VISIT, EST, LEVL III, 20-29 MIN: ICD-10-PCS | Mod: S$GLB,,, | Performed by: INTERNAL MEDICINE

## 2019-05-21 PROCEDURE — 99213 OFFICE O/P EST LOW 20 MIN: CPT | Mod: S$GLB,,, | Performed by: INTERNAL MEDICINE

## 2019-05-21 PROCEDURE — 99999 PR PBB SHADOW E&M-EST. PATIENT-LVL III: CPT | Mod: PBBFAC,,, | Performed by: INTERNAL MEDICINE

## 2019-05-21 PROCEDURE — 3008F BODY MASS INDEX DOCD: CPT | Mod: CPTII,S$GLB,, | Performed by: INTERNAL MEDICINE

## 2019-05-21 NOTE — PROGRESS NOTES
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    HPI   Philip Presley has previously been treated with harvoni and achieved an SVR12 HCV VL last checked today 11/20/18 and was still negative; hx of liver biopsy, F3. His liver disease is complicated by thrombocytopenia, mildly elevated LFTs (ALT 36). The low plts would suggest cirrhosis. Platelets today are improved to 208. The patient denies any symptoms of decompensated cirrhosis, including no ascites or edema, cognitive problems that would suggest hepatic encephalopathy, or GI bleeding from varices. No varices on EGD 05/16.    He is feeling well. He denies N/V, abdominal pain or diarrhea.     Last labs 11/20/18: Liver function is normal: Tbil 0.8, albumin 4.8, creat 1.1, Na 139, INR 1.0.    MRI 11/20/18:  Redemonstration of subcentimeter enhancing hepatic lesions.  Essentially unchanged since previous exam.  Lesions are isointense on precontrast.  Liver margin is smooth. AFP 5.3 11/20/18. No obvious hepatoma.    US 5/16/19  No HCC or ascites    Outpatient Encounter Medications as of 5/21/2019   Medication Sig Dispense Refill    chlorzoxazone (PARAFON FORTE) 500 mg Tab Take 500 mg by mouth.  2    diclofenac (VOLTAREN) 75 MG EC tablet Take 75 mg by mouth once daily.  4    LYRICA 150 mg capsule Take 150 capsules by mouth 2 (two) times daily.   2    tramadol (ULTRAM) 50 mg tablet Take 100 mg by mouth every 4 (four) hours as needed.   2    zolpidem (AMBIEN CR) 12.5 MG CR tablet Take 12.5 mg by mouth nightly as needed.   2    [DISCONTINUED] fluticasone (FLONASE) 50 mcg/actuation nasal spray 2 sprays (100 mcg total) by Each Nare route once daily. 1 Bottle 5    [DISCONTINUED] loratadine (CLARITIN) 10 mg tablet Take 1 tablet (10 mg total) by mouth once daily.  0     [DISCONTINUED] methylPREDNISolone (MEDROL DOSEPACK) 4 mg tablet use as directed 1 Package 0     Facility-Administered Encounter Medications as of 5/21/2019   Medication Dose Route Frequency Provider Last Rate Last Dose    0.9%  NaCl infusion   Intravenous Continuous Wilbur Bee MD   Stopped at 08/28/18 0844    sodium chloride 0.9% flush 3 mL  3 mL Intravenous PRN Wilbur Bee MD         Review of patient's allergies indicates:   Allergen Reactions    Penicillins Hives     Reaction to it when pt was a baby.     Past Medical History:   Diagnosis Date    Cirrhosis     Fibromyalgia     History of hepatitis C 10/28/2015    Treated with Harvoni x 24 wks, SVR 24 10/2016     Vitiligo 11/19/2017       Review of Systems   Constitutional: Negative.    HENT: Negative.    Eyes: Negative.    Respiratory: Negative.    Cardiovascular: Negative.    Gastrointestinal: Negative.    Genitourinary: Negative.    Musculoskeletal: Negative.    Skin: Negative.    Neurological: Negative.    Psychiatric/Behavioral: Negative.      Vitals:    05/21/19 1425   BP: 104/77   Pulse: 65   Resp: 18   Temp: 97.6 °F (36.4 °C)       Physical Exam   Constitutional: He is oriented to person, place, and time. He appears well-developed and well-nourished.   HENT:   Head: Normocephalic and atraumatic.   Eyes: Pupils are equal, round, and reactive to light. Conjunctivae and EOM are normal. No scleral icterus.   Neck: Normal range of motion. Neck supple. No thyromegaly present.   Cardiovascular: Normal rate, regular rhythm and normal heart sounds.   Pulmonary/Chest: Effort normal and breath sounds normal. He has no rales.   Abdominal: Soft. Bowel sounds are normal. He exhibits no distension and no mass. There is no tenderness.   Musculoskeletal: Normal range of motion. He exhibits no edema.   Neurological: He is alert and oriented to person, place, and time.   Skin: Skin is warm and dry. No rash noted.   +vitiligo   Psychiatric: He has a normal mood and  affect.   Vitals reviewed.    MELD-Na score: 7 at 11/20/2018  1:26 PM  MELD score: 7 at 11/20/2018  1:26 PM  Calculated from:  Serum Creatinine: 1.1 mg/dL at 11/20/2018  1:26 PM  Serum Sodium: 139 mmol/L (Rounded to 137 mmol/L) at 11/20/2018  1:26 PM  Total Bilirubin: 0.8 mg/dL (Rounded to 1 mg/dL) at 11/20/2018  1:26 PM  INR(ratio): 1.0 at 11/20/2018  1:26 PM  Age: 58 years      Lab Results   Component Value Date    GLU 85 11/20/2018    BUN 20 11/20/2018    CREATININE 1.1 11/20/2018    CALCIUM 9.6 11/20/2018     11/20/2018    K 5.1 11/20/2018     11/20/2018    PROT 8.2 11/20/2018    CO2 27 11/20/2018    CO2 31 (H) 10/17/2016    ANIONGAP 8 11/20/2018    WBC 4.30 11/20/2018    RBC 5.39 11/20/2018    HGB 15.8 11/20/2018    HCT 46.8 11/20/2018    MCV 87 11/20/2018    MCH 29.3 11/20/2018    MCHC 33.8 11/20/2018     Lab Results   Component Value Date    RDW 13.6 11/20/2018     11/20/2018    MPV 7.9 (L) 11/20/2018    GRAN 2.7 11/20/2018    GRAN 63.9 11/20/2018    LYMPH 1.1 11/20/2018    LYMPH 25.7 11/20/2018    MONO 0.3 11/20/2018    MONO 6.6 11/20/2018    EOSINOPHIL 3.1 11/20/2018    BASOPHIL 0.7 11/20/2018    EOS 0.1 11/20/2018    BASO 0.00 11/20/2018    ALBUMIN 4.8 11/20/2018    BILIDIR 0.1 08/22/2018    AST 30 11/20/2018    AST 36 10/17/2016    ALT 36 11/20/2018    ALT 34 10/17/2016    ALKPHOS 58 11/20/2018    LABPROT 9.7 11/20/2018    INR 1.0 11/20/2018       Assessment and Plan:    Philip Presley is a 58 y.o. male with HCV-induced Cirrhosis  My current recommendations:  1. Compensated cirrhosis: MELD <15: remains medically early for liver transplant. Check meld labs every 6 months (due now). Hepatic lesions, multiple, stable: MRI in 6 months  3. HCV SVR: has attained an SVR  4. EGD for variceal screening: repeat in 2018/19.  5. Vitiligo: started 2010 (after dx of HCV). HCV is associated with HCV.     Return 6 months    . . . .

## 2019-10-30 ENCOUNTER — TELEPHONE (OUTPATIENT)
Dept: TRANSPLANT | Facility: CLINIC | Age: 59
End: 2019-10-30

## 2019-10-30 DIAGNOSIS — K74.60 HEPATIC CIRRHOSIS, UNSPECIFIED HEPATIC CIRRHOSIS TYPE, UNSPECIFIED WHETHER ASCITES PRESENT: Primary | ICD-10-CM

## 2020-09-08 ENCOUNTER — OFFICE VISIT (OUTPATIENT)
Dept: RHEUMATOLOGY | Facility: CLINIC | Age: 60
End: 2020-09-08
Payer: MEDICARE

## 2020-09-08 ENCOUNTER — HOSPITAL ENCOUNTER (OUTPATIENT)
Dept: RADIOLOGY | Facility: HOSPITAL | Age: 60
Discharge: HOME | End: 2020-09-08
Attending: STUDENT IN AN ORGANIZED HEALTH CARE EDUCATION/TRAINING PROGRAM
Payer: MEDICARE

## 2020-09-08 VITALS
WEIGHT: 218 LBS | SYSTOLIC BLOOD PRESSURE: 125 MMHG | DIASTOLIC BLOOD PRESSURE: 79 MMHG | HEART RATE: 68 BPM | BODY MASS INDEX: 27.98 KG/M2 | HEIGHT: 74 IN

## 2020-09-08 DIAGNOSIS — R93.7 ABNORMAL X-RAY OF PELVIS: Primary | ICD-10-CM

## 2020-09-08 DIAGNOSIS — M79.7 FIBROMYALGIA: Primary | ICD-10-CM

## 2020-09-08 DIAGNOSIS — Z86.19 HISTORY OF HEPATITIS C: ICD-10-CM

## 2020-09-08 DIAGNOSIS — M79.7 FIBROMYALGIA: ICD-10-CM

## 2020-09-08 DIAGNOSIS — G47.00 INSOMNIA, UNSPECIFIED TYPE: ICD-10-CM

## 2020-09-08 PROCEDURE — 99213 OFFICE O/P EST LOW 20 MIN: CPT | Mod: PBBFAC,25 | Performed by: STUDENT IN AN ORGANIZED HEALTH CARE EDUCATION/TRAINING PROGRAM

## 2020-09-08 PROCEDURE — 73521 X-RAY EXAM HIPS BI 2 VIEWS: CPT | Mod: TC

## 2020-09-08 PROCEDURE — 99999 PR PBB SHADOW E&M-EST. PATIENT-LVL III: CPT | Mod: PBBFAC,GC,, | Performed by: STUDENT IN AN ORGANIZED HEALTH CARE EDUCATION/TRAINING PROGRAM

## 2020-09-08 PROCEDURE — 99999 PR PBB SHADOW E&M-EST. PATIENT-LVL III: ICD-10-PCS | Mod: PBBFAC,GC,, | Performed by: STUDENT IN AN ORGANIZED HEALTH CARE EDUCATION/TRAINING PROGRAM

## 2020-09-08 PROCEDURE — 99204 OFFICE O/P NEW MOD 45 MIN: CPT | Mod: GC,S$GLB,, | Performed by: INTERNAL MEDICINE

## 2020-09-08 PROCEDURE — 99204 PR OFFICE/OUTPT VISIT, NEW, LEVL IV, 45-59 MIN: ICD-10-PCS | Mod: GC,S$GLB,, | Performed by: INTERNAL MEDICINE

## 2020-09-08 PROCEDURE — 73521 X-RAY EXAM HIPS BI 2 VIEWS: CPT | Mod: 26,,, | Performed by: RADIOLOGY

## 2020-09-08 PROCEDURE — 73521 XR HIPS BILATERAL 2 VIEW INCL AP PELVIS: ICD-10-PCS | Mod: 26,,, | Performed by: RADIOLOGY

## 2020-09-08 RX ORDER — ZOLPIDEM TARTRATE 12.5 MG/1
12.5 TABLET, FILM COATED, EXTENDED RELEASE ORAL NIGHTLY PRN
Qty: 21 TABLET | Refills: 0 | Status: SHIPPED | OUTPATIENT
Start: 2020-09-08 | End: 2020-09-17 | Stop reason: SDUPTHER

## 2020-09-08 RX ORDER — DICLOFENAC SODIUM 10 MG/G
2 GEL TOPICAL 4 TIMES DAILY
Qty: 1 TUBE | Refills: 3 | Status: SHIPPED | OUTPATIENT
Start: 2020-09-08 | End: 2021-01-28

## 2020-09-08 RX ORDER — CHLORZOXAZONE 500 MG/1
500 TABLET ORAL 3 TIMES DAILY PRN
Qty: 270 TABLET | Refills: 1 | Status: SHIPPED | OUTPATIENT
Start: 2020-09-08 | End: 2021-08-13

## 2020-09-08 RX ORDER — PREGABALIN 150 MG/1
150 CAPSULE ORAL 2 TIMES DAILY
Qty: 90 CAPSULE | Refills: 3 | Status: SHIPPED | OUTPATIENT
Start: 2020-09-08 | End: 2021-01-28

## 2020-09-08 NOTE — PROGRESS NOTES
Subjective:       Patient ID: Philip Presley is a 60 y.o. male.    Chief Complaint: New pt for fibromyalgia    Mr. Presley, 61 yo M w/PMH of HCV-induced cirrhosis (dx in 2007) s/p tx in 2007 (IFN--allergic rxn did not work) and in 2015 (w/Pedro, now in remission, no viral load detected) and fibromyalgia presents as a new pt.    After dx with HCV-cirrhosis, pt started experiencing whole body pains/insomnia. Saw rheumatology Dr. Santiago who diagnosed Fibromyalgia. Aerobic exercise was recommended however with continued pains, pt was started on Lyrica 150 mg BID, diclofenac 75 mg daily, chlorzaxazone 500 mg TID prn , tramadol 100 mg q4hrs prn, and Ambien 12.5 mg qHS prn. He has been on all 5 medicines since 2007 which were continued by Dr. Taylor. He sometimes does not take the tramadol or chlorzaxazone multiple times a day. He does not find much difference with or without diclofenac. He states the Ambien is the most important one for him. He has been out of Ambien x 1 month and has not been sleeping and resultant fatigue. Pains are usually in his back/shoulders. Has received steroid injections for shoulder bursitis in the past (last was 3 yrs ago) and doing well for now.     Pt has an ithcy rash on his chest that started after shaving and using a new lotion. Pt has since d/c'd it 1 day ago. Will cont to monitor, likely allergic rxn.    FH: no autoimmune hx noted  SH: denies any TOB, etoh, or drug use. No longer works since HCV dx in 2007, used to work in petroleum field      Widespread pain index 10  Note the areas which the patient has had pain over the last week:                   Shoulder-girdle, left 1               Shoulder-girdle, right 1                         Upper arm left                        Upper arm right                         Lower arm left 1                       Lower arm right 1    Hip (buttock, trochanter) left 1  Hip (buttock, trochanter) right 1                           Upper leg,  left 1                         Upper leg, right 1                           Lower leg, left                          Lower leg, right                                     Jaw, left                                    Jaw, right                                        Chest                                  Abdomen                               Upper back 1                              Lower back                                        Neck 1  Score will be from 0-19:                                         Symptom severity score 7  Fatigue  3  Waking Unrefreshed 3  Cognitive Symptoms    0 = no problem, 1=slight or mild problem 2= moderate; considerable problems often present and/or at a moderate level, 3 = severe, pervasive, continuous, life disturbing problem  For each of the 3 symptoms, indicate the level of severity over the past week using the Scale.  The symptom severity score is the sum of the severity of the 3 symptoms (fatigue, waking unrefreshed, and cognitive symptoms) plus the number of the following symptoms occurring during the previous 6 months:   Headaches 1  Pain or cramps in the lower abdomen  Depression  The final score is between 0 and 12                                          Criteria  Patient has fibromyalgia if the following 3 conditions are met:  1.  Widespread pain index greater than or equal to 7 and symptom severity score greater than or equal to 5 or widespread pain index between 3- 6, and symptom severity score greater than or equal to 9.    2.  Symptoms have been present in a similar level for at least 3 months  3.  The patient does not have a disorder that would otherwise sufficiently explain the pain      Review of Systems   Constitutional: Positive for fatigue. Negative for chills, fever and unexpected weight change.   HENT: Negative for mouth sores and trouble swallowing.    Eyes: Negative for photophobia, pain, redness and visual disturbance.   Respiratory: Negative for cough and  "shortness of breath.    Cardiovascular: Negative for chest pain.   Gastrointestinal: Negative for abdominal pain, diarrhea, nausea and vomiting.   Genitourinary: Negative for dysuria and genital sores.   Musculoskeletal: Positive for myalgias and neck pain. Negative for arthralgias, back pain, gait problem and joint swelling.   Skin: Positive for rash. Negative for wound.   Neurological: Negative for weakness, numbness and headaches.   Hematological: Negative for adenopathy. Does not bruise/bleed easily.   Psychiatric/Behavioral: Positive for sleep disturbance. The patient is not nervous/anxious.          Objective:   /79   Pulse 68   Ht 6' 2.4" (1.89 m)   Wt 98.9 kg (218 lb)   BMI 27.69 kg/m²      Physical Exam   Vitals reviewed.  Constitutional: He is oriented to person, place, and time and well-developed, well-nourished, and in no distress. No distress.   HENT:   Head: Normocephalic and atraumatic.   Right Ear: External ear normal.   Left Ear: External ear normal.   Nose: Nose normal.   Mouth/Throat: Oropharynx is clear and moist. No oropharyngeal exudate.   Eyes: Conjunctivae and EOM are normal. Pupils are equal, round, and reactive to light. Right eye exhibits no discharge. Left eye exhibits no discharge. No scleral icterus.   Neck: Neck supple. No thyromegaly present.   Cardiovascular: Normal rate, regular rhythm, normal heart sounds and intact distal pulses.    No murmur heard.  Pulmonary/Chest: Effort normal and breath sounds normal. No respiratory distress. He has no wheezes. He has no rales. He exhibits no tenderness.   Abdominal: Soft. Bowel sounds are normal. He exhibits no distension. There is no abdominal tenderness. There is no guarding.       Right Side Rheumatological Exam     Examination finds the shoulder, elbow, wrist, knee, 1st PIP, 1st MCP, 2nd PIP, 2nd MCP, 3rd PIP, 3rd MCP, 4th PIP, 4th MCP, 5th PIP and 5th MCP normal.    Muscle Strength (0-5 scale):  Neck Flexion:  5  Neck " Extension: 5  Deltoid:  5  Biceps: 5/5   Triceps:  5  : 5/5   Iliopsoas: 5  Quadriceps:  5   Distal Lower Extremity: 5    Left Side Rheumatological Exam     Examination finds the shoulder, elbow, wrist, knee, 1st PIP, 1st MCP, 2nd PIP, 2nd MCP, 3rd PIP, 3rd MCP, 4th PIP, 4th MCP, 5th PIP and 5th MCP normal.    Muscle Strength (0-5 scale):  Neck Flexion:  5  Neck Extension: 5  Deltoid:  5  Biceps: 5/5   Triceps:  5  :  5/5   Iliopsoas: 5  Quadriceps:  5   Distal Lower Extremity: 5      Neurological: He is alert and oriented to person, place, and time.   Skin: Skin is warm and dry. Rash noted. He is not diaphoretic. No erythema.     Erythema on chest in distrubution of shaving/new lotion likely allergic rxn  Vitiligo b/l UE     Psychiatric: Mood and affect normal.   Musculoskeletal: Normal range of motion. No tenderness, deformity or edema.      Comments: WPI 10. SSS 7.  No synovitis or dactylitis.  FROM UE and LE b/l  5/5 mm strength UE and LE b/l        Assessment:       1. Fibromyalgia    2. Insomnia, unspecified type    3. History of hepatitis C            Plan:       Problem List Items Addressed This Visit        ID    History of hepatitis C       Orthopedic    Fibromyalgia - Primary    Relevant Medications    LYRICA 150 mg capsule    chlorzoxazone (PARAFON FORTE) 500 mg Tab    zolpidem (AMBIEN CR) 12.5 MG CR tablet    diclofenac sodium (VOLTAREN) 1 % Gel    Other Relevant Orders    Ambulatory referral/consult to Pain Clinic    X-Ray Hips Bilateral 2 View Inc AP Pelvis       Other    Insomnia    Relevant Medications    zolpidem (AMBIEN CR) 12.5 MG CR tablet        61 yo M w/PMH of HCV-induced cirrhosis (dx in 2007) s/p tx in 2007 (IFN--allergic rxn did not work) and in 2015 (w/Pedro, now in remission, no viral load detected) and fibromyalgia presents as a new pt.    After dx with HCV-cirrhosis, pt started experiencing whole body pains/insomnia. Saw rheumatology Dr. Santiago who diagnosed Fibromyalgia.  Aerobic exercise was recommended however with continued pains, pt was started on Lyrica 150 mg BID, diclofenac 75 mg daily, chlorzaxazone 500 mg TID prn , tramadol 100 mg q4hrs prn, and Ambien 12.5 mg qHS prn. He has been on all 5 medicines since 2007 which were continued by Dr. Taylor.     - Ambulatory referral/consult to Pain Clinic; Future  - LYRICA 150 mg capsule; Take 1 capsule (150 mg total) by mouth 2 (two) times daily.  Dispense: 90 capsule; Refill: 3  - chlorzoxazone (PARAFON FORTE) 500 mg Tab; Take 1 tablet (500 mg total) by mouth 3 (three) times daily as needed.  Dispense: 270 tablet; Refill: 1  - zolpidem (AMBIEN CR) 12.5 MG CR tablet; Take 1 tablet (12.5 mg total) by mouth nightly as needed for Insomnia.  Dispense: 21 tablet; Refill: 0  - X-Ray Hips Bilateral 2 View Inc AP Pelvis; Future  - diclofenac sodium (VOLTAREN) 1 % Gel; Apply 2 g topically 4 (four) times daily.  Dispense: 1 Tube; Refill: 3    Plan:  - Meets criteria for fibromyalgia. Refilled above medicines.   - Lyrica 150 BID. Chlorzaxazone (mm relaxer) 500 mg TID prn.  - Diclofenac gel prn, can be used for OA/shoulder bursitis. Pt will no longer take po diclofenac. Pt will try to stop taking Tramadol, will not refill.   - 3 week supply of Ambien 12.5 mg qHS prn given. Pt to contact PCP or sleep medicine for further refills. Will not refill.  - amb referral to pain medicine  - Limited ROM on hip internal rotation, will obtain xrays    Discussed with Dr. Valdovinos. RTC 6 months.    Delmy Walls, DO  Rheumatology PGY5

## 2020-09-08 NOTE — PROGRESS NOTES
Pre chart    Answers for HPI/ROS submitted by the patient on 9/7/2020   fever: No  eye redness: No  mouth sores: No  headaches: No  shortness of breath: No  chest pain: No  trouble swallowing: No  diarrhea: No  constipation: No  unexpected weight change: No  genital sore: No  During the last 3 days, have you had a skin rash?: Yes  Bruises or bleeds easily: No  cough: No

## 2020-09-08 NOTE — PATIENT INSTRUCTIONS
Back Exercises: Hip Lift    To start, lie on your back with your knees bent and feet flat on the floor. Dont press your neck or lower back to the floor. Breathe deeply. You should feel comfortable and relaxed in this position:  · Tighten your abdomen and buttocks.  · Slowly raise your hips upward. Be careful not to arch your back.  · Hold for 5 seconds. Lower your hips to the floor.  · Repeat 10 times.  For your safety, check with your healthcare provider before starting an exercise program.   Date Last Reviewed: 8/16/2015  © 8271-2514 [employer]. [address] [place] [address]. All rights reserved. This information is not intended as a substitute for professional medical care. Always follow your healthcare professional's instructions.        Back Exercises: Hip Rotator Stretch    To start, lie on your back with your knees bent and feet flat on the floor. Dont press your neck or lower back to the floor. Breathe deeply. You should feel comfortable and relaxed in this position.  · Rest your right ankle on your left knee.  · Place a towel behind your left thigh, and use it to pull the knee toward your chest. Feel the stretch in your buttocks.  · Hold for 30 to 60 seconds. Release.  · Repeat 2 times.  · Switch legs.   · If there is any pain other than stretch in the knee or buttock, stop and contact your healthcare provider.  For your safety, check with your healthcare provider before starting an exercise program.   Date Last Reviewed: 8/16/2015 © 2000-2017 [employer]. [address] [place] [address]. All rights reserved. This information is not intended as a substitute for professional medical care. Always follow your healthcare professional's instructions.      Back Exercise to Keep Fit for Low Back Pain  To help in your recovery and to prevent further back problems, keep your back, abdominal muscles and legs strong. Walk daily as soon as you can. Gradually add  other physical activities such as swimming and biking, which can help improve lower back strength. Begin as soon as you can do them comfortably. Do not do any exercises that make your pain a lot worse. The following are some back exercises that can help relieve low back pain.     Pelvic tilt   Repeat 5-10 times, twice per day.  Lie flat on your back (or stand with your back to a wall), knees bent, feet flat on the floor, body relaxed. Tighten your abdominal and buttock muscles, and tilt your pelvis. The curve of the small of your back should flatten towards the floor (or wall). Hold 10 seconds and then relax.       Knee raise     Repeat 5-10 times, twice per day.    Lie flat on your back, knees bent. Bring one knee slowly to your chest. Hug your knee gently. Then lower your leg toward the floor, keeping your knee bent. Do not straighten your legs. Repeat exercise with your other leg.              Partial press-up     Lie face down on a soft, firm surface. Do not turn your head to either side. Rest your arms bent at the elbows alongside your body. Relax for a few minutes. Then raise your upper body enough to lean on your elbows. Relax your lower back and legs as much as possible. Hold this position for 30 seconds at first. Gradually work up to five minutes. Or try slow press-ups. Hold each for five seconds, and repeat five to six times.              Copyright © 2012 by [employer]   Kal HOLT, Miesha GARCIA, Carly J, John SOLIS, Vernon R, Thanh B, David K, Augustus C, Camelia B, Dago S, Ventura L, Mitali BROOKS. [employer]. Adult Acute and Subacute Low Back Pain. Updated November 2012.     Hip Abduction (Strength)    1. Lie on your right side on the floor with your legs straight.  2. Raise your left leg about 6 to 8 inches. Keep your legs and hips straight. Dont roll back onto your hip. Hold for 5 seconds, then lower your leg.  3. Repeat 10 times, or as  instructed.  4. Switch legs and repeat.     Challenge yourself  Put an elastic band or tubing around both ankles. Hold the band to the floor with your bottom ankle. Raise and lower your top leg slowly and steadily.   Date Last Reviewed: 3/10/2016  © 8472-8790 [employer]. [address] [place] [address]. All rights reserved. This information is not intended as a substitute for professional medical care. Always follow your healthcare professional's instructions.        Hip Abduction with External Rotation (Strength)    These instructions are for your right knee. Switch sides for your left  knee.  5. Get down on the floor on your hands and knees.  6. Lift your right leg up and out to the side. Keep the knee bent. Raise the leg as high as is comfortable. Hold for 3 seconds.  7. Slowly lower your leg back to the floor.  8. Repeat 5 times, or as instructed.  Date Last Reviewed: 3/10/2016  © 5193-6269 [employer]. [address] [place] [address]. All rights reserved. This information is not intended as a substitute for professional medical care. Always follow your healthcare professional's instructions.        Hip Adduction (Strength)    These instructions are for your right foot. Switch sides for your left foot.  9. Lie on your right side on the floor. Keep your right leg straight. Bend your left leg and put your left foot flat on the floor behind your right knee.  10. Raise your right leg as high as you comfortably can. Hold for 5 seconds, then lower it back down.  11. Repeat 10 times, or as instructed.  12. Switch legs and repeat.  Date Last Reviewed: 3/10/2016  © 8158-1785 [employer]. [address] [place] [address]. All rights reserved. This information is not intended as a substitute for professional medical care. Always follow your healthcare professional's instructions.        Hip Extension (Strength)    13. Lie on your back on the floor with  your knees bent and feet flat on the floor. Put your arms at your side, palms flat on the floor.  14. Tighten your core muscles and keep them tight through the whole exercise.  15. Push down on your feet and raise your hips and lift your buttocks off the floor.  16. Dont arch your back.  17. Hold for 5 seconds.  18. Lower your buttocks back down to the floor.  19. Repeat 5 to 10 times, or as instructed.  Date Last Reviewed: 3/10/2016  © 6964-9037 [employer]. [address] [place] [address]. All rights reserved. This information is not intended as a substitute for professional medical care. Always follow your healthcare professional's instructions.

## 2020-09-14 ENCOUNTER — TELEPHONE (OUTPATIENT)
Dept: HEPATOLOGY | Facility: CLINIC | Age: 60
End: 2020-09-14

## 2020-09-14 NOTE — TELEPHONE ENCOUNTER
----- Message from Taylor Navarrete MA sent at 9/11/2020  4:14 PM CDT -----  Regarding: FW: Appt request  Contact: Georges    ----- Message -----  From: Kailey Srivastava  Sent: 9/11/2020   8:13 AM CDT  To: [facility]ago Staff  Subject: Appt request                                     Message    Appointment Request From: Philip Presley    With Provider: Gem Vasquez MD [[facility] [place] - Hepatology]    Preferred Date Range: 11/1/2020 - 12/1/2020    Preferred Times: Any Time    Reason for visit: Liver check up    Comments:  I need to schedule my glenda. With you soon and wanted to see if you are still available in [place]

## 2020-09-17 ENCOUNTER — OFFICE VISIT (OUTPATIENT)
Dept: PRIMARY CARE CLINIC | Facility: CLINIC | Age: 60
End: 2020-09-17
Payer: MEDICARE

## 2020-09-17 VITALS
WEIGHT: 217.13 LBS | HEIGHT: 73 IN | SYSTOLIC BLOOD PRESSURE: 110 MMHG | HEART RATE: 58 BPM | TEMPERATURE: 98 F | BODY MASS INDEX: 28.78 KG/M2 | OXYGEN SATURATION: 99 % | RESPIRATION RATE: 18 BRPM | DIASTOLIC BLOOD PRESSURE: 68 MMHG

## 2020-09-17 DIAGNOSIS — G47.00 INSOMNIA, UNSPECIFIED TYPE: ICD-10-CM

## 2020-09-17 DIAGNOSIS — K74.60 CIRRHOSIS OF LIVER WITHOUT ASCITES, UNSPECIFIED HEPATIC CIRRHOSIS TYPE: ICD-10-CM

## 2020-09-17 DIAGNOSIS — Z13.6 ENCOUNTER FOR LIPID SCREENING FOR CARDIOVASCULAR DISEASE: ICD-10-CM

## 2020-09-17 DIAGNOSIS — Z12.5 SCREENING FOR PROSTATE CANCER: ICD-10-CM

## 2020-09-17 DIAGNOSIS — Z86.19 HISTORY OF HEPATITIS C: ICD-10-CM

## 2020-09-17 DIAGNOSIS — M79.7 FIBROMYALGIA: Primary | ICD-10-CM

## 2020-09-17 DIAGNOSIS — R73.09 ELEVATED GLUCOSE: ICD-10-CM

## 2020-09-17 DIAGNOSIS — Z12.11 ENCOUNTER FOR SCREENING COLONOSCOPY: ICD-10-CM

## 2020-09-17 DIAGNOSIS — Z00.00 ANNUAL PHYSICAL EXAM: ICD-10-CM

## 2020-09-17 DIAGNOSIS — M77.8 THUMB TENDONITIS: ICD-10-CM

## 2020-09-17 DIAGNOSIS — Z23 ENCOUNTER FOR VACCINATION: ICD-10-CM

## 2020-09-17 DIAGNOSIS — Z13.220 ENCOUNTER FOR LIPID SCREENING FOR CARDIOVASCULAR DISEASE: ICD-10-CM

## 2020-09-17 DIAGNOSIS — M79.644 THUMB PAIN, RIGHT: ICD-10-CM

## 2020-09-17 LAB
BILIRUB SERPL-MCNC: NORMAL MG/DL
BLOOD URINE, POC: NORMAL
CLARITY, POC UA: CLEAR
COLOR, POC UA: YELLOW
GLUCOSE UR QL STRIP: NORMAL
KETONES UR QL STRIP: NORMAL
LEUKOCYTE ESTERASE URINE, POC: NORMAL
NITRITE, POC UA: NORMAL
PH, POC UA: 5
PROTEIN, POC: NORMAL
SPECIFIC GRAVITY, POC UA: 1.02
UROBILINOGEN, POC UA: NORMAL

## 2020-09-17 PROCEDURE — 81002 URINALYSIS NONAUTO W/O SCOPE: CPT | Mod: S$GLB,,, | Performed by: INTERNAL MEDICINE

## 2020-09-17 PROCEDURE — 90471 IMMUNIZATION ADMIN: CPT | Mod: S$GLB,,, | Performed by: INTERNAL MEDICINE

## 2020-09-17 PROCEDURE — 3008F BODY MASS INDEX DOCD: CPT | Mod: CPTII,S$GLB,, | Performed by: INTERNAL MEDICINE

## 2020-09-17 PROCEDURE — 99999 PR PBB SHADOW E&M-EST. PATIENT-LVL IV: ICD-10-PCS | Mod: PBBFAC,,, | Performed by: INTERNAL MEDICINE

## 2020-09-17 PROCEDURE — 81002 POCT URINE DIPSTICK WITHOUT MICROSCOPE: ICD-10-PCS | Mod: S$GLB,,, | Performed by: INTERNAL MEDICINE

## 2020-09-17 PROCEDURE — 3008F PR BODY MASS INDEX (BMI) DOCUMENTED: ICD-10-PCS | Mod: CPTII,S$GLB,, | Performed by: INTERNAL MEDICINE

## 2020-09-17 PROCEDURE — 90714 TD VACCINE GREATER THAN OR EQUAL TO 7YO PRESERVATIVE FREE IM: ICD-10-PCS | Mod: S$GLB,,, | Performed by: INTERNAL MEDICINE

## 2020-09-17 PROCEDURE — 99214 PR OFFICE/OUTPT VISIT, EST, LEVL IV, 30-39 MIN: ICD-10-PCS | Mod: 25,S$GLB,, | Performed by: INTERNAL MEDICINE

## 2020-09-17 PROCEDURE — 90714 TD VACC NO PRESV 7 YRS+ IM: CPT | Mod: S$GLB,,, | Performed by: INTERNAL MEDICINE

## 2020-09-17 PROCEDURE — 99999 PR PBB SHADOW E&M-EST. PATIENT-LVL IV: CPT | Mod: PBBFAC,,, | Performed by: INTERNAL MEDICINE

## 2020-09-17 PROCEDURE — 99214 OFFICE O/P EST MOD 30 MIN: CPT | Mod: 25,S$GLB,, | Performed by: INTERNAL MEDICINE

## 2020-09-17 PROCEDURE — 90471 TD VACCINE GREATER THAN OR EQUAL TO 7YO PRESERVATIVE FREE IM: ICD-10-PCS | Mod: S$GLB,,, | Performed by: INTERNAL MEDICINE

## 2020-09-17 RX ORDER — ZOLPIDEM TARTRATE 12.5 MG/1
12.5 TABLET, FILM COATED, EXTENDED RELEASE ORAL NIGHTLY PRN
Qty: 30 TABLET | Refills: 3 | Status: SHIPPED | OUTPATIENT
Start: 2020-09-17 | End: 2021-01-28 | Stop reason: SDUPTHER

## 2020-09-17 NOTE — PROGRESS NOTES
Verified pt ID using name and . Allergies verified with pt. Administered TD Vaccine IM in Left deltoid per physician order using aseptic technique. Aspirated and no blood return noted. Pt tolerated well with no adverse reactions noted.

## 2020-09-18 NOTE — PROGRESS NOTES
Subjective:       Patient ID: Philip Presley is a 60 y.o. male.    Chief Complaint: Annual Exam and Hand Pain (right thumb pain when grabbing stuff x 3 days)    HPI  Pt visit today for routine f/u and establish care he ha sh/o fibromyalgia Hepc C infected treated with livercirrhoses stage 3 in the past was treated with interferon that make pt sick with serious side effect with skin discoloration hair loss and also ? Causing fibromyalgia pt ha sbeen treated by rheumatologist Dr Lo for > 10 yrs who unfortunately paeesed away recently pt has seen by another rheumatology but was told not treating fibromyalgias pt ha robb on tramadol and ambien for insomnia for > 12 yrs without it can't sleep make his fibromyalgia worse pt ha sbeen evaluated by sleep specialist and multiple sleep studies prescribed ambien since then . Forhis hep c infection pt ha sbeen followed with hepatologist and was treated with harvona and Hep C PCR ha sbeen negative AFP test has been negative has smal lesion in liver follow with MRI in last 6 yrs no change benign. Pt today c/o fibromyalgia symptoms pain and rt thumb hurt x 3 days after try to cut grass no trauma . Pt states his fibromyalgia usually manageble if he do some physical  Activity worse if overdo it or stationary. He denies sob cp or any h/o pul or cardiac abnormality  Review of Systems    Objective:      Physical Exam    Assessment:       1. Fibromyalgia    2. History of hepatitis C    3. Insomnia, unspecified type    4. Cirrhosis of liver without ascites, unspecified hepatic cirrhosis type    5. Thumb tendonitis    6. Encounter for lipid screening for cardiovascular disease    7. Screening for prostate cancer    8. Annual physical exam    9. Encounter for vaccination    10. Elevated glucose    11. Encounter for screening colonoscopy    12. Thumb pain, right        Plan:       Fibromyalgia  Comments:  continue with light physical activity consider PT if pt desires and try avoid  chronic opiate use continue withlyrica and muscle relaxant  Orders:  -     zolpidem (AMBIEN CR) 12.5 MG CR tablet; Take 1 tablet (12.5 mg total) by mouth nightly as needed for Insomnia.  Dispense: 30 tablet; Refill: 3  -     TSH; Future; Expected date: 09/17/2020  -     T4, free; Future; Expected date: 09/17/2020  -     POCT urine dipstick without microscope    History of hepatitis C  Comments:  continue f/u with hepatology    Insomnia, unspecified type  Comments:  continue with ambien for now discuss with pt long term use currently has no other alternative and pt need sleep to avoid exacerbation of his fibromyalgia  Orders:  -     zolpidem (AMBIEN CR) 12.5 MG CR tablet; Take 1 tablet (12.5 mg total) by mouth nightly as needed for Insomnia.  Dispense: 30 tablet; Refill: 3  -     TSH; Future; Expected date: 09/17/2020  -     T4, free; Future; Expected date: 09/17/2020    Cirrhosis of liver without ascites, unspecified hepatic cirrhosis type  -     Comprehensive metabolic panel; Future; Expected date: 09/17/2020    Thumb tendonitis    Encounter for lipid screening for cardiovascular disease  -     Lipid Panel; Future; Expected date: 09/17/2020    Screening for prostate cancer  -     PSA, Screening; Future; Expected date: 09/17/2020    Annual physical exam    Encounter for vaccination  -     Cancel: Pneumococcal Polysaccharide Vaccine (23 Valent) (SQ/IM)  -     (In Office Administered) Td Vaccine - Preservative Free  -     Cancel: Influenza - Quadrivalent (PF)    Elevated glucose  -     CBC auto differential; Future; Expected date: 09/17/2020  -     Hemoglobin A1C; Future; Expected date: 09/17/2020    Encounter for screening colonoscopy  -     Fecal Immunochemical Test (iFOBT); Future; Expected date: 09/18/2020    Thumb pain, right  Comments:  at the base rt thumb prob tendonitis or sprain consider injection if not better with OTC cream        Medication List with Changes/Refills   Current Medications    CHLORZOXAZONE  (PARAFON FORTE) 500 MG TAB    Take 1 tablet (500 mg total) by mouth 3 (three) times daily as needed.    DICLOFENAC SODIUM (VOLTAREN) 1 % GEL    Apply 2 g topically 4 (four) times daily.    LYRICA 150 MG CAPSULE    Take 1 capsule (150 mg total) by mouth 2 (two) times daily.    TRAMADOL (ULTRAM) 50 MG TABLET    Take 100 mg by mouth every 4 (four) hours as needed.    Changed and/or Refilled Medications    Modified Medication Previous Medication    ZOLPIDEM (AMBIEN CR) 12.5 MG CR TABLET zolpidem (AMBIEN CR) 12.5 MG CR tablet       Take 1 tablet (12.5 mg total) by mouth nightly as needed for Insomnia.    Take 1 tablet (12.5 mg total) by mouth nightly as needed for Insomnia.   Discontinued Medications    DICLOFENAC (VOLTAREN) 75 MG EC TABLET    Take 75 mg by mouth once daily.

## 2020-09-30 ENCOUNTER — CLINICAL SUPPORT (OUTPATIENT)
Dept: PRIMARY CARE CLINIC | Facility: CLINIC | Age: 60
End: 2020-09-30
Payer: MEDICARE

## 2020-09-30 NOTE — PROGRESS NOTES
Verified pt ID using name and . Allergies verified with pt. Dr. Wang Administered 0.5 mL Cortisone and 0.5 mL Lidocaine 1% in Left thumb using aseptic technique. Pt tolerated well with no adverse reactions noted.

## 2020-10-05 ENCOUNTER — PATIENT MESSAGE (OUTPATIENT)
Dept: ADMINISTRATIVE | Facility: HOSPITAL | Age: 60
End: 2020-10-05

## 2020-11-03 ENCOUNTER — PATIENT MESSAGE (OUTPATIENT)
Dept: HEPATOLOGY | Facility: CLINIC | Age: 60
End: 2020-11-03

## 2020-11-06 ENCOUNTER — TELEPHONE (OUTPATIENT)
Dept: TRANSPLANT | Facility: CLINIC | Age: 60
End: 2020-11-06

## 2020-11-06 DIAGNOSIS — K74.60 HEPATIC CIRRHOSIS, UNSPECIFIED HEPATIC CIRRHOSIS TYPE, UNSPECIFIED WHETHER ASCITES PRESENT: Primary | ICD-10-CM

## 2020-11-19 ENCOUNTER — PATIENT MESSAGE (OUTPATIENT)
Dept: HEPATOLOGY | Facility: CLINIC | Age: 60
End: 2020-11-19

## 2020-11-20 NOTE — PROGRESS NOTES
HEPATOLOGY FOLLOW UP    Referring Physician: Remy Wang MD    Current Corresponding Physician: Remy Wang MD    Philip Presley is here for follow up of HCV-induced cirrhosis.     HPI   Philip Presley has previously been treated with harvoni and achieved an SVR12 HCV VL last checked today 11/20/18 and was still negative; hx of liver biopsy, F3 but had thrombocytopenia (now improved - 239 11/16/20).    HE: none  Ascites: none  EV:  No varices on EGD 08/18.    He is feeling well. He denies N/V, abdominal pain or diarrhea. Had a recent yeast skin infection. Took diflucan.     Last labs 11/16/20:   MELD-Na score: 10 at 11/16/2020  1:05 PM  MELD score: 10 at 11/16/2020  1:05 PM  Calculated from:  Serum Creatinine: 1.4 mg/dL at 11/16/2020  1:05 PM  Serum Sodium: 139 mmol/L (Rounded to 137 mmol/L) at 11/16/2020  1:05 PM  Total Bilirubin: 1.1 mg/dL at 11/16/2020  1:05 PM  INR(ratio): 1.0 at 11/16/2020  1:05 PM  Age: 60 years 2 months  Plts have improved to 239 (<150 in 2016)    MRI 11/19:  No MRI evidence for focal hepatic abnormality. Continued follow-up is recommended as clinically necessary..    US 5/16/19  No HCC or ascites. Has not done a more recent US.    AFP 11/16/20: 3.7    Outpatient Encounter Medications as of 11/23/2020   Medication Sig Dispense Refill    chlorzoxazone (PARAFON FORTE) 500 mg Tab Take 1 tablet (500 mg total) by mouth 3 (three) times daily as needed. 270 tablet 1    diclofenac sodium (VOLTAREN) 1 % Gel Apply 2 g topically 4 (four) times daily. 1 Tube 3    LYRICA 150 mg capsule Take 1 capsule (150 mg total) by mouth 2 (two) times daily. 90 capsule 3    tramadol (ULTRAM) 50 mg tablet Take 100 mg by mouth every 4 (four) hours as needed.   2    zolpidem (AMBIEN CR) 12.5 MG CR tablet Take 1 tablet (12.5 mg total) by mouth nightly as needed for Insomnia. 30 tablet 3     Facility-Administered Encounter Medications as of 11/23/2020   Medication Dose Route Frequency Provider Last Rate Last  Dose    0.9%  NaCl infusion   Intravenous Continuous Wilbur Bee MD   Stopped at 08/28/18 0844    sodium chloride 0.9% flush 3 mL  3 mL Intravenous PRN Wilbur Bee MD         Review of patient's allergies indicates:   Allergen Reactions    Penicillins Hives     Reaction to it when pt was a baby.     Past Medical History:   Diagnosis Date    Cirrhosis     Fibromyalgia     History of hepatitis C 10/28/2015    Treated with Harvoni x 24 wks, SVR 24 10/2016     Vitiligo 11/19/2017       Review of Systems   Constitutional: Negative.    HENT: Negative.    Eyes: Negative.    Respiratory: Negative.    Cardiovascular: Negative.    Gastrointestinal: Negative.    Genitourinary: Negative.    Musculoskeletal: Negative.    Skin: Negative.    Neurological: Negative.    Psychiatric/Behavioral: Negative.      Vitals:    11/23/20 0809   BP: (!) 149/80   Pulse: (!) 55       Physical Exam  Vitals signs reviewed.   Constitutional:       Appearance: He is well-developed.   HENT:      Head: Normocephalic and atraumatic.   Eyes:      General: No scleral icterus.     Conjunctiva/sclera: Conjunctivae normal.      Pupils: Pupils are equal, round, and reactive to light.   Neck:      Musculoskeletal: Normal range of motion and neck supple.      Thyroid: No thyromegaly.   Cardiovascular:      Rate and Rhythm: Normal rate and regular rhythm.      Heart sounds: Normal heart sounds.   Pulmonary:      Effort: Pulmonary effort is normal.      Breath sounds: Normal breath sounds. No rales.   Abdominal:      General: Bowel sounds are normal. There is no distension.      Palpations: Abdomen is soft. There is no mass.      Tenderness: There is no abdominal tenderness.   Musculoskeletal: Normal range of motion.   Skin:     General: Skin is warm and dry.      Findings: No rash.      Comments: +vitiligo   Neurological:      Mental Status: He is alert and oriented to person, place, and time.       MELD-Na score: 10 at 11/16/2020  1:05 PM  MELD score: 10  at 11/16/2020  1:05 PM  Calculated from:  Serum Creatinine: 1.4 mg/dL at 11/16/2020  1:05 PM  Serum Sodium: 139 mmol/L (Rounded to 137 mmol/L) at 11/16/2020  1:05 PM  Total Bilirubin: 1.1 mg/dL at 11/16/2020  1:05 PM  INR(ratio): 1.0 at 11/16/2020  1:05 PM  Age: 60 years 2 months      Lab Results   Component Value Date    GLU 91 11/16/2020    BUN 25 (H) 11/16/2020    CREATININE 1.4 11/16/2020    CALCIUM 9.4 11/16/2020     11/16/2020    K 4.6 11/16/2020     (L) 11/16/2020    PROT 7.7 11/16/2020    CO2 24 11/16/2020    CO2 31 (H) 10/17/2016    ANIONGAP 15 11/16/2020    WBC 4.80 11/16/2020    RBC 4.90 11/16/2020    HGB 14.8 11/16/2020    HCT 43.4 11/16/2020    MCV 89 11/16/2020    MCH 30.2 11/16/2020    MCHC 34.1 11/16/2020     Lab Results   Component Value Date    RDW 13.5 11/16/2020     11/16/2020    MPV 6.7 (L) 11/16/2020    GRAN 3.3 11/16/2020    GRAN 69.3 11/16/2020    LYMPH 1.0 11/16/2020    LYMPH 21.0 11/16/2020    MONO 0.3 11/16/2020    MONO 7.2 11/16/2020    EOSINOPHIL 2.0 11/16/2020    BASOPHIL 0.5 11/16/2020    EOS 0.1 11/16/2020    BASO 0.00 11/16/2020    CHOL 181 09/17/2020    TRIG 47 09/17/2020    HDL 73 09/17/2020    CHOLHDL 40.3 09/17/2020    TOTALCHOLEST 2.5 09/17/2020    ALBUMIN 4.9 11/16/2020    BILIDIR 0.1 08/22/2018    AST 29 11/16/2020    AST 36 10/17/2016    ALT 29 11/16/2020    ALT 34 10/17/2016    ALKPHOS 47 11/16/2020    LABPROT 10.5 11/16/2020    INR 1.0 11/16/2020    PSA 1.1 09/17/2020       Assessment and Plan:    Philip Presley is a 60 y.o. male with HCV-induced No chief complaint on file.  My current recommendations:  1. Compensated cirrhosis: MELD <15: remains medically early for liver transplant. Check meld labs every 6 months. Hepatic lesions, multiple, none on most recent MRI; recommend US now  3. HCV SVR: has attained an SVR  4. EGD for variceal screening: given improvement in platelets and no EV on EGD in 2018- no repeat needed    Return 6 months    . . . .

## 2020-11-23 ENCOUNTER — OFFICE VISIT (OUTPATIENT)
Dept: HEPATOLOGY | Facility: CLINIC | Age: 60
End: 2020-11-23
Payer: MEDICARE

## 2020-11-23 ENCOUNTER — PATIENT MESSAGE (OUTPATIENT)
Dept: HEPATOLOGY | Facility: CLINIC | Age: 60
End: 2020-11-23

## 2020-11-23 VITALS
OXYGEN SATURATION: 99 % | BODY MASS INDEX: 28.78 KG/M2 | DIASTOLIC BLOOD PRESSURE: 80 MMHG | WEIGHT: 217.13 LBS | HEART RATE: 55 BPM | HEIGHT: 73 IN | SYSTOLIC BLOOD PRESSURE: 149 MMHG

## 2020-11-23 DIAGNOSIS — K74.60 HEPATIC CIRRHOSIS, UNSPECIFIED HEPATIC CIRRHOSIS TYPE, UNSPECIFIED WHETHER ASCITES PRESENT: Primary | ICD-10-CM

## 2020-11-23 DIAGNOSIS — Z86.19 HISTORY OF HEPATITIS C: ICD-10-CM

## 2020-11-23 DIAGNOSIS — K74.60 CIRRHOSIS OF LIVER WITHOUT ASCITES, UNSPECIFIED HEPATIC CIRRHOSIS TYPE: Primary | ICD-10-CM

## 2020-11-23 PROCEDURE — 3008F BODY MASS INDEX DOCD: CPT | Mod: CPTII,S$GLB,, | Performed by: INTERNAL MEDICINE

## 2020-11-23 PROCEDURE — 99213 PR OFFICE/OUTPT VISIT, EST, LEVL III, 20-29 MIN: ICD-10-PCS | Mod: S$GLB,,, | Performed by: INTERNAL MEDICINE

## 2020-11-23 PROCEDURE — 3008F PR BODY MASS INDEX (BMI) DOCUMENTED: ICD-10-PCS | Mod: CPTII,S$GLB,, | Performed by: INTERNAL MEDICINE

## 2020-11-23 PROCEDURE — 99213 OFFICE O/P EST LOW 20 MIN: CPT | Mod: S$GLB,,, | Performed by: INTERNAL MEDICINE

## 2020-11-23 PROCEDURE — 1126F AMNT PAIN NOTED NONE PRSNT: CPT | Mod: S$GLB,,, | Performed by: INTERNAL MEDICINE

## 2020-11-23 PROCEDURE — 1126F PR PAIN SEVERITY QUANTIFIED, NO PAIN PRESENT: ICD-10-PCS | Mod: S$GLB,,, | Performed by: INTERNAL MEDICINE

## 2020-11-23 PROCEDURE — 99999 PR PBB SHADOW E&M-EST. PATIENT-LVL III: ICD-10-PCS | Mod: PBBFAC,,, | Performed by: INTERNAL MEDICINE

## 2020-11-23 PROCEDURE — 99999 PR PBB SHADOW E&M-EST. PATIENT-LVL III: CPT | Mod: PBBFAC,,, | Performed by: INTERNAL MEDICINE

## 2020-11-25 ENCOUNTER — PATIENT OUTREACH (OUTPATIENT)
Dept: ADMINISTRATIVE | Facility: HOSPITAL | Age: 60
End: 2020-11-25

## 2020-11-25 NOTE — PROGRESS NOTES
Chart review done.  updated. Immunizations reviewed & updated. Care Everywhere updated.  Chart update with colonoscopy from 5/13/2015 . Not due until 5/2030

## 2021-01-28 ENCOUNTER — OFFICE VISIT (OUTPATIENT)
Dept: PRIMARY CARE CLINIC | Facility: CLINIC | Age: 61
End: 2021-01-28
Payer: MEDICARE

## 2021-01-28 VITALS
WEIGHT: 220 LBS | BODY MASS INDEX: 29.8 KG/M2 | RESPIRATION RATE: 18 BRPM | SYSTOLIC BLOOD PRESSURE: 120 MMHG | DIASTOLIC BLOOD PRESSURE: 76 MMHG | HEART RATE: 69 BPM | HEIGHT: 72 IN | OXYGEN SATURATION: 100 % | TEMPERATURE: 98 F

## 2021-01-28 DIAGNOSIS — G47.00 INSOMNIA, UNSPECIFIED TYPE: ICD-10-CM

## 2021-01-28 DIAGNOSIS — K76.6 HYPERTENSION, PORTAL: ICD-10-CM

## 2021-01-28 DIAGNOSIS — M79.7 FIBROMYALGIA: ICD-10-CM

## 2021-01-28 DIAGNOSIS — Z11.4 SCREENING FOR HIV (HUMAN IMMUNODEFICIENCY VIRUS): Primary | ICD-10-CM

## 2021-01-28 DIAGNOSIS — Z13.6 ENCOUNTER FOR LIPID SCREENING FOR CARDIOVASCULAR DISEASE: ICD-10-CM

## 2021-01-28 DIAGNOSIS — Z13.220 ENCOUNTER FOR LIPID SCREENING FOR CARDIOVASCULAR DISEASE: ICD-10-CM

## 2021-01-28 DIAGNOSIS — M77.8 DELTOID TENDONITIS OF RIGHT SHOULDER: ICD-10-CM

## 2021-01-28 DIAGNOSIS — R73.09 ELEVATED GLUCOSE: ICD-10-CM

## 2021-01-28 DIAGNOSIS — Z12.5 SCREENING FOR PROSTATE CANCER: ICD-10-CM

## 2021-01-28 PROCEDURE — 99999 PR PBB SHADOW E&M-EST. PATIENT-LVL IV: ICD-10-PCS | Mod: PBBFAC,,, | Performed by: INTERNAL MEDICINE

## 2021-01-28 PROCEDURE — 99214 PR OFFICE/OUTPT VISIT, EST, LEVL IV, 30-39 MIN: ICD-10-PCS | Mod: S$GLB,,, | Performed by: INTERNAL MEDICINE

## 2021-01-28 PROCEDURE — 99999 PR PBB SHADOW E&M-EST. PATIENT-LVL IV: CPT | Mod: PBBFAC,,, | Performed by: INTERNAL MEDICINE

## 2021-01-28 PROCEDURE — 1125F AMNT PAIN NOTED PAIN PRSNT: CPT | Mod: S$GLB,,, | Performed by: INTERNAL MEDICINE

## 2021-01-28 PROCEDURE — 3008F PR BODY MASS INDEX (BMI) DOCUMENTED: ICD-10-PCS | Mod: CPTII,S$GLB,, | Performed by: INTERNAL MEDICINE

## 2021-01-28 PROCEDURE — 3008F BODY MASS INDEX DOCD: CPT | Mod: CPTII,S$GLB,, | Performed by: INTERNAL MEDICINE

## 2021-01-28 PROCEDURE — 99214 OFFICE O/P EST MOD 30 MIN: CPT | Mod: S$GLB,,, | Performed by: INTERNAL MEDICINE

## 2021-01-28 PROCEDURE — 1125F PR PAIN SEVERITY QUANTIFIED, PAIN PRESENT: ICD-10-PCS | Mod: S$GLB,,, | Performed by: INTERNAL MEDICINE

## 2021-01-28 RX ORDER — ZOLPIDEM TARTRATE 12.5 MG/1
12.5 TABLET, FILM COATED, EXTENDED RELEASE ORAL NIGHTLY PRN
Qty: 30 TABLET | Refills: 5 | Status: SHIPPED | OUTPATIENT
Start: 2021-01-28 | End: 2021-08-02 | Stop reason: SDUPTHER

## 2021-01-28 RX ORDER — ZOLPIDEM TARTRATE 12.5 MG/1
12.5 TABLET, FILM COATED, EXTENDED RELEASE ORAL NIGHTLY PRN
Qty: 30 TABLET | Refills: 5 | OUTPATIENT
Start: 2021-01-28

## 2021-02-02 ENCOUNTER — TELEPHONE (OUTPATIENT)
Dept: PRIMARY CARE CLINIC | Facility: CLINIC | Age: 61
End: 2021-02-02

## 2021-02-02 DIAGNOSIS — M77.8 DELTOID TENDONITIS OF RIGHT SHOULDER: Primary | ICD-10-CM

## 2021-02-23 ENCOUNTER — PATIENT MESSAGE (OUTPATIENT)
Dept: RHEUMATOLOGY | Facility: CLINIC | Age: 61
End: 2021-02-23

## 2021-04-27 ENCOUNTER — TELEPHONE (OUTPATIENT)
Dept: HEPATOLOGY | Facility: CLINIC | Age: 61
End: 2021-04-27

## 2021-04-27 ENCOUNTER — PATIENT MESSAGE (OUTPATIENT)
Dept: HEPATOLOGY | Facility: CLINIC | Age: 61
End: 2021-04-27

## 2021-04-28 ENCOUNTER — TELEPHONE (OUTPATIENT)
Dept: HEPATOLOGY | Facility: CLINIC | Age: 61
End: 2021-04-28

## 2021-04-28 DIAGNOSIS — K74.60 HEPATIC CIRRHOSIS, UNSPECIFIED HEPATIC CIRRHOSIS TYPE, UNSPECIFIED WHETHER ASCITES PRESENT: Primary | ICD-10-CM

## 2021-05-18 ENCOUNTER — OFFICE VISIT (OUTPATIENT)
Dept: HEPATOLOGY | Facility: CLINIC | Age: 61
End: 2021-05-18
Payer: MEDICARE

## 2021-05-18 VITALS
WEIGHT: 216.94 LBS | RESPIRATION RATE: 18 BRPM | HEIGHT: 72 IN | BODY MASS INDEX: 29.38 KG/M2 | DIASTOLIC BLOOD PRESSURE: 60 MMHG | HEART RATE: 68 BPM | SYSTOLIC BLOOD PRESSURE: 130 MMHG | OXYGEN SATURATION: 99 %

## 2021-05-18 DIAGNOSIS — K74.60 CIRRHOSIS OF LIVER WITHOUT ASCITES, UNSPECIFIED HEPATIC CIRRHOSIS TYPE: Primary | ICD-10-CM

## 2021-05-18 DIAGNOSIS — Z86.19 HISTORY OF HEPATITIS C: ICD-10-CM

## 2021-05-18 PROCEDURE — 3008F PR BODY MASS INDEX (BMI) DOCUMENTED: ICD-10-PCS | Mod: CPTII,S$GLB,, | Performed by: INTERNAL MEDICINE

## 2021-05-18 PROCEDURE — 1126F PR PAIN SEVERITY QUANTIFIED, NO PAIN PRESENT: ICD-10-PCS | Mod: S$GLB,,, | Performed by: INTERNAL MEDICINE

## 2021-05-18 PROCEDURE — 99999 PR PBB SHADOW E&M-EST. PATIENT-LVL IV: ICD-10-PCS | Mod: PBBFAC,,, | Performed by: INTERNAL MEDICINE

## 2021-05-18 PROCEDURE — 1126F AMNT PAIN NOTED NONE PRSNT: CPT | Mod: S$GLB,,, | Performed by: INTERNAL MEDICINE

## 2021-05-18 PROCEDURE — 3008F BODY MASS INDEX DOCD: CPT | Mod: CPTII,S$GLB,, | Performed by: INTERNAL MEDICINE

## 2021-05-18 PROCEDURE — 99213 OFFICE O/P EST LOW 20 MIN: CPT | Mod: S$GLB,,, | Performed by: INTERNAL MEDICINE

## 2021-05-18 PROCEDURE — 99213 PR OFFICE/OUTPT VISIT, EST, LEVL III, 20-29 MIN: ICD-10-PCS | Mod: S$GLB,,, | Performed by: INTERNAL MEDICINE

## 2021-05-18 PROCEDURE — 99999 PR PBB SHADOW E&M-EST. PATIENT-LVL IV: CPT | Mod: PBBFAC,,, | Performed by: INTERNAL MEDICINE

## 2021-08-03 ENCOUNTER — TELEPHONE (OUTPATIENT)
Dept: PHARMACY | Facility: CLINIC | Age: 61
End: 2021-08-03

## 2021-08-13 ENCOUNTER — TELEPHONE (OUTPATIENT)
Dept: PRIMARY CARE CLINIC | Facility: CLINIC | Age: 61
End: 2021-08-13

## 2021-08-13 ENCOUNTER — OFFICE VISIT (OUTPATIENT)
Dept: PRIMARY CARE CLINIC | Facility: CLINIC | Age: 61
End: 2021-08-13
Payer: MEDICARE

## 2021-08-13 DIAGNOSIS — M54.50 CHRONIC MIDLINE LOW BACK PAIN WITHOUT SCIATICA: ICD-10-CM

## 2021-08-13 DIAGNOSIS — M79.7 FIBROMYALGIA: ICD-10-CM

## 2021-08-13 DIAGNOSIS — G89.29 CHRONIC MIDLINE LOW BACK PAIN WITHOUT SCIATICA: ICD-10-CM

## 2021-08-13 DIAGNOSIS — M77.8 DELTOID TENDONITIS OF RIGHT SHOULDER: ICD-10-CM

## 2021-08-13 DIAGNOSIS — G47.00 INSOMNIA, UNSPECIFIED TYPE: ICD-10-CM

## 2021-08-13 DIAGNOSIS — U07.1 COVID-19 VIRUS INFECTION: Primary | ICD-10-CM

## 2021-08-13 PROCEDURE — 99442 PR PHYSICIAN TELEPHONE EVALUATION 11-20 MIN: ICD-10-PCS | Mod: 95,,, | Performed by: INTERNAL MEDICINE

## 2021-08-13 PROCEDURE — 3044F PR MOST RECENT HEMOGLOBIN A1C LEVEL <7.0%: ICD-10-PCS | Mod: CPTII,95,, | Performed by: INTERNAL MEDICINE

## 2021-08-13 PROCEDURE — 1159F PR MEDICATION LIST DOCUMENTED IN MEDICAL RECORD: ICD-10-PCS | Mod: CPTII,95,, | Performed by: INTERNAL MEDICINE

## 2021-08-13 PROCEDURE — 1160F PR REVIEW ALL MEDS BY PRESCRIBER/CLIN PHARMACIST DOCUMENTED: ICD-10-PCS | Mod: CPTII,95,, | Performed by: INTERNAL MEDICINE

## 2021-08-13 PROCEDURE — 1159F MED LIST DOCD IN RCRD: CPT | Mod: CPTII,95,, | Performed by: INTERNAL MEDICINE

## 2021-08-13 PROCEDURE — 99442 PR PHYSICIAN TELEPHONE EVALUATION 11-20 MIN: CPT | Mod: 95,,, | Performed by: INTERNAL MEDICINE

## 2021-08-13 PROCEDURE — 3044F HG A1C LEVEL LT 7.0%: CPT | Mod: CPTII,95,, | Performed by: INTERNAL MEDICINE

## 2021-08-13 PROCEDURE — 1160F RVW MEDS BY RX/DR IN RCRD: CPT | Mod: CPTII,95,, | Performed by: INTERNAL MEDICINE

## 2021-08-13 RX ORDER — ZOLPIDEM TARTRATE 12.5 MG/1
12.5 TABLET, FILM COATED, EXTENDED RELEASE ORAL NIGHTLY PRN
Qty: 30 TABLET | Refills: 5 | Status: SHIPPED | OUTPATIENT
Start: 2021-08-13 | End: 2022-02-02 | Stop reason: SDUPTHER

## 2021-10-28 ENCOUNTER — PATIENT OUTREACH (OUTPATIENT)
Dept: ADMINISTRATIVE | Facility: OTHER | Age: 61
End: 2021-10-28
Payer: MEDICARE

## 2021-11-02 ENCOUNTER — OFFICE VISIT (OUTPATIENT)
Dept: HEPATOLOGY | Facility: CLINIC | Age: 61
End: 2021-11-02
Payer: MEDICARE

## 2021-11-02 VITALS
SYSTOLIC BLOOD PRESSURE: 135 MMHG | RESPIRATION RATE: 18 BRPM | DIASTOLIC BLOOD PRESSURE: 78 MMHG | BODY MASS INDEX: 28.58 KG/M2 | WEIGHT: 215.63 LBS | HEIGHT: 73 IN | TEMPERATURE: 98 F | HEART RATE: 63 BPM | OXYGEN SATURATION: 98 %

## 2021-11-02 DIAGNOSIS — K74.60 HEPATIC CIRRHOSIS, UNSPECIFIED HEPATIC CIRRHOSIS TYPE, UNSPECIFIED WHETHER ASCITES PRESENT: Primary | ICD-10-CM

## 2021-11-02 PROCEDURE — 99213 OFFICE O/P EST LOW 20 MIN: CPT | Mod: S$GLB,,, | Performed by: INTERNAL MEDICINE

## 2021-11-02 PROCEDURE — 99214 OFFICE O/P EST MOD 30 MIN: CPT | Mod: PBBFAC,PN | Performed by: INTERNAL MEDICINE

## 2021-11-02 PROCEDURE — 99213 PR OFFICE/OUTPT VISIT, EST, LEVL III, 20-29 MIN: ICD-10-PCS | Mod: S$GLB,,, | Performed by: INTERNAL MEDICINE

## 2021-11-02 PROCEDURE — 99999 PR PBB SHADOW E&M-EST. PATIENT-LVL IV: ICD-10-PCS | Mod: PBBFAC,,, | Performed by: INTERNAL MEDICINE

## 2021-11-02 PROCEDURE — 99999 PR PBB SHADOW E&M-EST. PATIENT-LVL IV: CPT | Mod: PBBFAC,,, | Performed by: INTERNAL MEDICINE

## 2021-11-03 ENCOUNTER — IMMUNIZATION (OUTPATIENT)
Dept: PRIMARY CARE CLINIC | Facility: CLINIC | Age: 61
End: 2021-11-03
Payer: MEDICARE

## 2021-11-03 DIAGNOSIS — Z23 NEED FOR VACCINATION: Primary | ICD-10-CM

## 2021-11-03 PROCEDURE — 0031A COVID-19,VECTOR-NR,RS-AD26,PF,0.5 ML DOSE VACCINE (JANSSEN): CPT | Mod: CV19,PBBFAC | Performed by: INTERNAL MEDICINE

## 2022-02-02 ENCOUNTER — OFFICE VISIT (OUTPATIENT)
Dept: PRIMARY CARE CLINIC | Facility: CLINIC | Age: 62
End: 2022-02-02
Payer: MEDICARE

## 2022-02-02 VITALS
WEIGHT: 216.5 LBS | HEART RATE: 60 BPM | SYSTOLIC BLOOD PRESSURE: 116 MMHG | HEIGHT: 73 IN | RESPIRATION RATE: 18 BRPM | DIASTOLIC BLOOD PRESSURE: 74 MMHG | OXYGEN SATURATION: 99 % | BODY MASS INDEX: 28.69 KG/M2

## 2022-02-02 DIAGNOSIS — Z13.6 ENCOUNTER FOR LIPID SCREENING FOR CARDIOVASCULAR DISEASE: Primary | ICD-10-CM

## 2022-02-02 DIAGNOSIS — Z12.5 SCREENING FOR PROSTATE CANCER: ICD-10-CM

## 2022-02-02 DIAGNOSIS — M79.7 FIBROMYALGIA: ICD-10-CM

## 2022-02-02 DIAGNOSIS — Z13.220 ENCOUNTER FOR LIPID SCREENING FOR CARDIOVASCULAR DISEASE: Primary | ICD-10-CM

## 2022-02-02 DIAGNOSIS — G47.00 INSOMNIA, UNSPECIFIED TYPE: ICD-10-CM

## 2022-02-02 PROCEDURE — 3008F PR BODY MASS INDEX (BMI) DOCUMENTED: ICD-10-PCS | Mod: CPTII,S$GLB,, | Performed by: INTERNAL MEDICINE

## 2022-02-02 PROCEDURE — 1159F MED LIST DOCD IN RCRD: CPT | Mod: CPTII,S$GLB,, | Performed by: INTERNAL MEDICINE

## 2022-02-02 PROCEDURE — 1159F PR MEDICATION LIST DOCUMENTED IN MEDICAL RECORD: ICD-10-PCS | Mod: CPTII,S$GLB,, | Performed by: INTERNAL MEDICINE

## 2022-02-02 PROCEDURE — 1160F RVW MEDS BY RX/DR IN RCRD: CPT | Mod: CPTII,S$GLB,, | Performed by: INTERNAL MEDICINE

## 2022-02-02 PROCEDURE — 3078F PR MOST RECENT DIASTOLIC BLOOD PRESSURE < 80 MM HG: ICD-10-PCS | Mod: CPTII,S$GLB,, | Performed by: INTERNAL MEDICINE

## 2022-02-02 PROCEDURE — 99214 OFFICE O/P EST MOD 30 MIN: CPT | Mod: S$GLB,,, | Performed by: INTERNAL MEDICINE

## 2022-02-02 PROCEDURE — 1160F PR REVIEW ALL MEDS BY PRESCRIBER/CLIN PHARMACIST DOCUMENTED: ICD-10-PCS | Mod: CPTII,S$GLB,, | Performed by: INTERNAL MEDICINE

## 2022-02-02 PROCEDURE — 3074F PR MOST RECENT SYSTOLIC BLOOD PRESSURE < 130 MM HG: ICD-10-PCS | Mod: CPTII,S$GLB,, | Performed by: INTERNAL MEDICINE

## 2022-02-02 PROCEDURE — 3008F BODY MASS INDEX DOCD: CPT | Mod: CPTII,S$GLB,, | Performed by: INTERNAL MEDICINE

## 2022-02-02 PROCEDURE — 99214 PR OFFICE/OUTPT VISIT, EST, LEVL IV, 30-39 MIN: ICD-10-PCS | Mod: S$GLB,,, | Performed by: INTERNAL MEDICINE

## 2022-02-02 PROCEDURE — 99999 PR PBB SHADOW E&M-EST. PATIENT-LVL III: CPT | Mod: PBBFAC,,, | Performed by: INTERNAL MEDICINE

## 2022-02-02 PROCEDURE — 99999 PR PBB SHADOW E&M-EST. PATIENT-LVL III: ICD-10-PCS | Mod: PBBFAC,,, | Performed by: INTERNAL MEDICINE

## 2022-02-02 PROCEDURE — 3074F SYST BP LT 130 MM HG: CPT | Mod: CPTII,S$GLB,, | Performed by: INTERNAL MEDICINE

## 2022-02-02 PROCEDURE — 3078F DIAST BP <80 MM HG: CPT | Mod: CPTII,S$GLB,, | Performed by: INTERNAL MEDICINE

## 2022-02-02 RX ORDER — TRAMADOL HYDROCHLORIDE 50 MG/1
50 TABLET ORAL EVERY 8 HOURS PRN
Qty: 60 TABLET | Refills: 1 | Status: SHIPPED | OUTPATIENT
Start: 2022-02-02 | End: 2022-04-13 | Stop reason: SDUPTHER

## 2022-02-02 RX ORDER — ZOLPIDEM TARTRATE 12.5 MG/1
12.5 TABLET, FILM COATED, EXTENDED RELEASE ORAL NIGHTLY PRN
Qty: 30 TABLET | Refills: 5 | Status: SHIPPED | OUTPATIENT
Start: 2022-02-02 | End: 2022-08-08 | Stop reason: SDUPTHER

## 2022-02-02 NOTE — PROGRESS NOTES
Subjective:       Patient ID: Philip Presley is a 61 y.o. male.    Chief Complaint: Annual Exam    HPI  Pt viist today with c/o fibromyalgia flare up due to weather change he is having pan in both shoulder elbows hands knees not able to rest getting depressed du eto pain pt does not have any med to take used to be on mediactons in apst but ha snot f/u with rheumatology no fever chill no sob cp no JHAVERI pt also request refill on ambien for sleep that he is on for many yrs. Pt can't tolerate cymbalta and lyrica. He had ci=ovud vaccine x1 with J&J  Review of Systems    Objective:      Physical Exam  Vitals and nursing note reviewed.   Constitutional:       General: He is not in acute distress.     Appearance: He is well-developed and well-nourished.   HENT:      Head: Normocephalic and atraumatic.      Right Ear: External ear normal.      Left Ear: External ear normal.      Nose: Nose normal.      Mouth/Throat:      Mouth: Oropharynx is clear and moist.      Pharynx: No oropharyngeal exudate.   Eyes:      Extraocular Movements: Extraocular movements intact and EOM normal.      Conjunctiva/sclera: Conjunctivae normal.      Pupils: Pupils are equal, round, and reactive to light.   Neck:      Thyroid: No thyromegaly.   Cardiovascular:      Rate and Rhythm: Normal rate and regular rhythm.      Pulses: Intact distal pulses.      Heart sounds: Normal heart sounds. No murmur heard.  No friction rub. No gallop.    Pulmonary:      Effort: Pulmonary effort is normal. No respiratory distress.      Breath sounds: Normal breath sounds. No wheezing or rales.   Abdominal:      General: Bowel sounds are normal. There is no distension.      Palpations: Abdomen is soft.      Tenderness: There is no abdominal tenderness. There is no guarding.   Musculoskeletal:         General: Tenderness (diffused joint and muscle pain) present. No deformity or edema. Normal range of motion.      Cervical back: Normal range of motion and neck supple.    Lymphadenopathy:      Cervical: No cervical adenopathy.   Skin:     General: Skin is warm and dry.      Capillary Refill: Capillary refill takes less than 2 seconds.      Findings: No erythema or rash.   Neurological:      Mental Status: He is alert and oriented to person, place, and time.   Psychiatric:         Mood and Affect: Mood and affect normal.         Thought Content: Thought content normal.         Judgment: Judgment normal.      Comments: Appear in down mood today         Assessment:       1. Encounter for lipid screening for cardiovascular disease    2. Fibromyalgia    3. Insomnia, unspecified type    4. Screening for prostate cancer        Plan:       Encounter for lipid screening for cardiovascular disease  -     Lipid Panel; Future; Expected date: 02/02/2022    Fibromyalgia  Comments:  continue with light physical activity consider PT if pt desires and try avoid chronic opiate use continue withlyrica and muscle relaxant  Orders:  -     zolpidem (AMBIEN CR) 12.5 MG CR tablet; Take 1 tablet (12.5 mg total) by mouth nightly as needed for Insomnia.  Dispense: 30 tablet; Refill: 5  -     traMADoL (ULTRAM) 50 mg tablet; Take 1 tablet (50 mg total) by mouth every 8 (eight) hours as needed for Pain.  Dispense: 60 tablet; Refill: 1    Insomnia, unspecified type  Comments:  continue with ambien for now discuss with pt long term use currently has no other alternative and pt need sleep to avoid exacerbation of his fibromyalgia  Orders:  -     zolpidem (AMBIEN CR) 12.5 MG CR tablet; Take 1 tablet (12.5 mg total) by mouth nightly as needed for Insomnia.  Dispense: 30 tablet; Refill: 5    Screening for prostate cancer  -     PSA, Screening; Future; Expected date: 02/02/2022  -     Urinalysis; Future; Expected date: 02/02/2022        Medication List with Changes/Refills   New Medications    TRAMADOL (ULTRAM) 50 MG TABLET    Take 1 tablet (50 mg total) by mouth every 8 (eight) hours as needed for Pain.   Changed  and/or Refilled Medications    Modified Medication Previous Medication    ZOLPIDEM (AMBIEN CR) 12.5 MG CR TABLET zolpidem (AMBIEN CR) 12.5 MG CR tablet       Take 1 tablet (12.5 mg total) by mouth nightly as needed for Insomnia.    Take 1 tablet (12.5 mg total) by mouth nightly as needed for Insomnia.

## 2022-04-13 ENCOUNTER — OFFICE VISIT (OUTPATIENT)
Dept: PRIMARY CARE CLINIC | Facility: CLINIC | Age: 62
End: 2022-04-13
Payer: MEDICARE

## 2022-04-13 VITALS
OXYGEN SATURATION: 99 % | RESPIRATION RATE: 16 BRPM | SYSTOLIC BLOOD PRESSURE: 116 MMHG | BODY MASS INDEX: 27.77 KG/M2 | HEART RATE: 50 BPM | HEIGHT: 74 IN | WEIGHT: 216.38 LBS | DIASTOLIC BLOOD PRESSURE: 84 MMHG

## 2022-04-13 DIAGNOSIS — Z12.5 SCREENING FOR PROSTATE CANCER: ICD-10-CM

## 2022-04-13 DIAGNOSIS — Z13.6 ENCOUNTER FOR LIPID SCREENING FOR CARDIOVASCULAR DISEASE: ICD-10-CM

## 2022-04-13 DIAGNOSIS — M54.50 CHRONIC MIDLINE LOW BACK PAIN WITHOUT SCIATICA: Primary | ICD-10-CM

## 2022-04-13 DIAGNOSIS — M79.7 FIBROMYALGIA: ICD-10-CM

## 2022-04-13 DIAGNOSIS — Z13.220 ENCOUNTER FOR LIPID SCREENING FOR CARDIOVASCULAR DISEASE: ICD-10-CM

## 2022-04-13 DIAGNOSIS — R20.2 LEFT HAND PARESTHESIA: ICD-10-CM

## 2022-04-13 DIAGNOSIS — G89.29 CHRONIC MIDLINE LOW BACK PAIN WITHOUT SCIATICA: Primary | ICD-10-CM

## 2022-04-13 PROCEDURE — 1159F MED LIST DOCD IN RCRD: CPT | Mod: CPTII,S$GLB,, | Performed by: INTERNAL MEDICINE

## 2022-04-13 PROCEDURE — 1159F PR MEDICATION LIST DOCUMENTED IN MEDICAL RECORD: ICD-10-PCS | Mod: CPTII,S$GLB,, | Performed by: INTERNAL MEDICINE

## 2022-04-13 PROCEDURE — 99999 PR PBB SHADOW E&M-EST. PATIENT-LVL III: CPT | Mod: PBBFAC,,, | Performed by: INTERNAL MEDICINE

## 2022-04-13 PROCEDURE — 99999 PR PBB SHADOW E&M-EST. PATIENT-LVL III: ICD-10-PCS | Mod: PBBFAC,,, | Performed by: INTERNAL MEDICINE

## 2022-04-13 PROCEDURE — 99213 PR OFFICE/OUTPT VISIT, EST, LEVL III, 20-29 MIN: ICD-10-PCS | Mod: S$GLB,,, | Performed by: INTERNAL MEDICINE

## 2022-04-13 PROCEDURE — 3079F DIAST BP 80-89 MM HG: CPT | Mod: CPTII,S$GLB,, | Performed by: INTERNAL MEDICINE

## 2022-04-13 PROCEDURE — 3008F BODY MASS INDEX DOCD: CPT | Mod: CPTII,S$GLB,, | Performed by: INTERNAL MEDICINE

## 2022-04-13 PROCEDURE — 3074F SYST BP LT 130 MM HG: CPT | Mod: CPTII,S$GLB,, | Performed by: INTERNAL MEDICINE

## 2022-04-13 PROCEDURE — 3008F PR BODY MASS INDEX (BMI) DOCUMENTED: ICD-10-PCS | Mod: CPTII,S$GLB,, | Performed by: INTERNAL MEDICINE

## 2022-04-13 PROCEDURE — 1160F RVW MEDS BY RX/DR IN RCRD: CPT | Mod: CPTII,S$GLB,, | Performed by: INTERNAL MEDICINE

## 2022-04-13 PROCEDURE — 1160F PR REVIEW ALL MEDS BY PRESCRIBER/CLIN PHARMACIST DOCUMENTED: ICD-10-PCS | Mod: CPTII,S$GLB,, | Performed by: INTERNAL MEDICINE

## 2022-04-13 PROCEDURE — 99213 OFFICE O/P EST LOW 20 MIN: CPT | Mod: S$GLB,,, | Performed by: INTERNAL MEDICINE

## 2022-04-13 PROCEDURE — 3079F PR MOST RECENT DIASTOLIC BLOOD PRESSURE 80-89 MM HG: ICD-10-PCS | Mod: CPTII,S$GLB,, | Performed by: INTERNAL MEDICINE

## 2022-04-13 PROCEDURE — 3074F PR MOST RECENT SYSTOLIC BLOOD PRESSURE < 130 MM HG: ICD-10-PCS | Mod: CPTII,S$GLB,, | Performed by: INTERNAL MEDICINE

## 2022-04-13 RX ORDER — TRAMADOL HYDROCHLORIDE 50 MG/1
50 TABLET ORAL EVERY 8 HOURS PRN
Qty: 60 TABLET | Refills: 2 | Status: SHIPPED | OUTPATIENT
Start: 2022-04-13 | End: 2022-10-06 | Stop reason: SDUPTHER

## 2022-04-13 NOTE — PROGRESS NOTES
Subjective:       Patient ID: Philip Presley is a 61 y.o. male.    Chief Complaint: Numbness (In the left hand over the past few weeks. )    HPI  Pt with fibromyalgias insomnia   Review of Systems  chronic lower back pain pt today c/o numbness left and and fingers and lwoer back pain aggrevvated by working help mom repair her house in [place] he has to take tramadol BID for the pain in lower back  His mom has dementia he denies sob cp JHAVERI or dizziness  Objective:      Physical Exam  Vitals and nursing note reviewed.   Constitutional:       General: He is not in acute distress.     Appearance: He is well-developed.   HENT:      Head: Normocephalic and atraumatic.      Right Ear: External ear normal.      Left Ear: External ear normal.      Nose: Nose normal.      Mouth/Throat:      Pharynx: No oropharyngeal exudate.   Eyes:      Extraocular Movements: Extraocular movements intact.      Conjunctiva/sclera: Conjunctivae normal.      Pupils: Pupils are equal, round, and reactive to light.   Neck:      Thyroid: No thyromegaly.   Cardiovascular:      Rate and Rhythm: Normal rate and regular rhythm.      Heart sounds: Normal heart sounds. No murmur heard.    No friction rub. No gallop.   Pulmonary:      Effort: Pulmonary effort is normal. No respiratory distress.      Breath sounds: Normal breath sounds. No wheezing or rales.   Abdominal:      General: Bowel sounds are normal. There is no distension.      Palpations: Abdomen is soft.      Tenderness: There is no abdominal tenderness. There is no guarding.   Musculoskeletal:         General: Tenderness (subjective numbness left hand fingers and lower back pain ) present. No deformity. Normal range of motion.      Cervical back: Normal range of motion and neck supple.   Lymphadenopathy:      Cervical: No cervical adenopathy.   Skin:     General: Skin is warm and dry.      Findings: No erythema or rash.   Neurological:      Mental Status: He is alert and oriented to person,  place, and time.   Psychiatric:         Mood and Affect: Mood normal.         Thought Content: Thought content normal.         Judgment: Judgment normal.         Assessment:       1. Left hand paresthesia    2. Fibromyalgia    3. Encounter for lipid screening for cardiovascular disease    4. Screening for prostate cancer        Plan:       Left hand paresthesia  -     TSH; Future; Expected date: 04/13/2022  -     T4, Free; Future; Expected date: 04/13/2022  -     Vitamin B12; Future; Expected date: 04/13/2022    Fibromyalgia  Comments:  continue with light physical activity consider PT if pt desires and try avoid chronic opiate use continue withlyrica and muscle relaxant  Orders:  -     traMADoL (ULTRAM) 50 mg tablet; Take 1 tablet (50 mg total) by mouth every 8 (eight) hours as needed for Pain.  Dispense: 60 tablet; Refill: 2  -     CBC Auto Differential; Future; Expected date: 04/13/2022  -     Comprehensive Metabolic Panel; Future; Expected date: 04/13/2022  -     Urinalysis; Future; Expected date: 04/13/2022    Encounter for lipid screening for cardiovascular disease    Screening for prostate cancer  -     PSA, Screening; Future; Expected date: 04/13/2022        Medication List with Changes/Refills   Current Medications    ZOLPIDEM (AMBIEN CR) 12.5 MG CR TABLET    Take 1 tablet (12.5 mg total) by mouth nightly as needed for Insomnia.   Changed and/or Refilled Medications    Modified Medication Previous Medication    TRAMADOL (ULTRAM) 50 MG TABLET traMADoL (ULTRAM) 50 mg tablet       Take 1 tablet (50 mg total) by mouth every 8 (eight) hours as needed for Pain.    Take 1 tablet (50 mg total) by mouth every 8 (eight) hours as needed for Pain.

## 2022-05-03 ENCOUNTER — OFFICE VISIT (OUTPATIENT)
Dept: HEPATOLOGY | Facility: CLINIC | Age: 62
End: 2022-05-03

## 2022-05-03 ENCOUNTER — TELEPHONE (OUTPATIENT)
Dept: HEPATOLOGY | Facility: CLINIC | Age: 62
End: 2022-05-03
Payer: MEDICARE

## 2022-05-03 VITALS
RESPIRATION RATE: 17 BRPM | OXYGEN SATURATION: 98 % | HEART RATE: 62 BPM | BODY MASS INDEX: 28.18 KG/M2 | HEIGHT: 74 IN | DIASTOLIC BLOOD PRESSURE: 76 MMHG | SYSTOLIC BLOOD PRESSURE: 134 MMHG | TEMPERATURE: 98 F | WEIGHT: 219.56 LBS

## 2022-05-03 DIAGNOSIS — K74.60 HEPATIC CIRRHOSIS, UNSPECIFIED HEPATIC CIRRHOSIS TYPE, UNSPECIFIED WHETHER ASCITES PRESENT: Primary | ICD-10-CM

## 2022-05-03 DIAGNOSIS — Z86.19 HISTORY OF HEPATITIS C: ICD-10-CM

## 2022-05-03 PROCEDURE — 99999 PR PBB SHADOW E&M-EST. PATIENT-LVL V: CPT | Mod: PBBFAC,,, | Performed by: INTERNAL MEDICINE

## 2022-05-03 PROCEDURE — 99999 PR PBB SHADOW E&M-EST. PATIENT-LVL V: ICD-10-PCS | Mod: PBBFAC,,, | Performed by: INTERNAL MEDICINE

## 2022-05-03 PROCEDURE — 99214 OFFICE O/P EST MOD 30 MIN: CPT | Mod: S$GLB,,, | Performed by: INTERNAL MEDICINE

## 2022-05-03 PROCEDURE — 99214 PR OFFICE/OUTPT VISIT, EST, LEVL IV, 30-39 MIN: ICD-10-PCS | Mod: S$GLB,,, | Performed by: INTERNAL MEDICINE

## 2022-05-03 NOTE — PROGRESS NOTES
HEPATOLOGY FOLLOW UP    Referring Physician: Remy Wang MD    Current Corresponding Physician: Remy Wang MD    Philip Presley is here for follow up of HCV-induced cirrhosis.     HPI   Philip Presley has previously been treated with harvoni and achieved an SVR12 HCV VL last checked 11/20/18 and was still negative; hx of liver biopsy, F3 but had thrombocytopenia (now improved > 200).    HE: none  Ascites: none  EV:  No varices on EGD 08/18.    He is feeling well. He denies N/V, abdominal pain or diarrhea. No longer seeing rheumatology due to dx of fibromyalgia. Pt referred back to PCP for management. Likely had covid in Aug 2021.    Last labs 11/2/21: ALT 25, AST 21, AFP 3.1  Abdo US 11/3/21: fatty liver; no HCC    MELD-Na score: 10 at 11/2/2021  3:23 PM  MELD score: 10 at 11/2/2021  3:23 PM  Calculated from:  Serum Creatinine: 1.4 mg/dL at 11/2/2021  3:23 PM  Serum Sodium: 142 mmol/L (Using max of 137 mmol/L) at 11/2/2021  3:23 PM  Total Bilirubin: 0.7 mg/dL (Using min of 1 mg/dL) at 11/2/2021  3:23 PM  INR(ratio): 1.0 at 11/2/2021  3:23 PM  Age: 61 years  Plts have improved to 239 (<150 in 2016)    MRI 11/19:  No MRI evidence for focal hepatic abnormality. Continued follow-up is recommended as clinically necessary..      Outpatient Encounter Medications as of 5/3/2022   Medication Sig Dispense Refill    zolpidem (AMBIEN CR) 12.5 MG CR tablet Take 1 tablet (12.5 mg total) by mouth nightly as needed for Insomnia. 30 tablet 5    traMADoL (ULTRAM) 50 mg tablet Take 1 tablet (50 mg total) by mouth every 8 (eight) hours as needed for Pain. (Patient not taking: Reported on 5/3/2022) 60 tablet 2     Facility-Administered Encounter Medications as of 5/3/2022   Medication Dose Route Frequency Provider Last Rate Last Admin    0.9%  NaCl infusion   Intravenous Continuous Wilbur MD Rohit   Stopped at 08/28/18 0844    sodium chloride 0.9% flush 3 mL  3 mL Intravenous PRN Wilbur Ray, MD         Review of patient's  allergies indicates:   Allergen Reactions    Penicillins Hives     Reaction to it when pt was a baby.     Past Medical History:   Diagnosis Date    Cirrhosis     Fibromyalgia     History of hepatitis C 10/28/2015    Treated with Harvoni x 24 wks, SVR 24 10/2016     Vitiligo 11/19/2017       Review of Systems   Constitutional: Negative.    HENT: Negative.    Eyes: Negative.    Respiratory: Negative.    Cardiovascular: Negative.    Gastrointestinal: Negative.    Genitourinary: Negative.    Musculoskeletal: Negative.    Skin: Negative.    Neurological: Negative.    Psychiatric/Behavioral: Negative.      Vitals:    05/03/22 1301   BP: 134/76   Pulse: 62   Resp: 17   Temp: 97.6 °F (36.4 °C)       Physical Exam  Vitals reviewed.   Constitutional:       Appearance: He is well-developed.   HENT:      Head: Normocephalic and atraumatic.   Eyes:      General: No scleral icterus.     Conjunctiva/sclera: Conjunctivae normal.      Pupils: Pupils are equal, round, and reactive to light.   Neck:      Thyroid: No thyromegaly.   Cardiovascular:      Rate and Rhythm: Normal rate and regular rhythm.      Heart sounds: Normal heart sounds.   Pulmonary:      Effort: Pulmonary effort is normal.      Breath sounds: Normal breath sounds. No rales.   Abdominal:      General: Bowel sounds are normal. There is no distension.      Palpations: Abdomen is soft. There is no mass.      Tenderness: There is no abdominal tenderness.   Musculoskeletal:         General: Normal range of motion.      Cervical back: Normal range of motion and neck supple.   Skin:     General: Skin is warm and dry.      Findings: No rash.      Comments: +vitiligo   Neurological:      Mental Status: He is alert and oriented to person, place, and time.       MELD-Na score: 10 at 11/2/2021  3:23 PM  MELD score: 10 at 11/2/2021  3:23 PM  Calculated from:  Serum Creatinine: 1.4 mg/dL at 11/2/2021  3:23 PM  Serum Sodium: 142 mmol/L (Using max of 137 mmol/L) at 11/2/2021   3:23 PM  Total Bilirubin: 0.7 mg/dL (Using min of 1 mg/dL) at 11/2/2021  3:23 PM  INR(ratio): 1.0 at 11/2/2021  3:23 PM  Age: 61 years      Lab Results   Component Value Date    GLU 82 11/02/2021    BUN 19 11/02/2021    CREATININE 1.4 11/02/2021    CALCIUM 9.7 11/02/2021     11/02/2021    K 4.3 11/02/2021     11/02/2021    PROT 7.3 11/02/2021    CO2 25 11/02/2021    CO2 31 (H) 10/17/2016    ANIONGAP 9 11/02/2021    WBC 5.49 11/02/2021    RBC 4.66 11/02/2021    HGB 14.0 11/02/2021    HCT 42.2 11/02/2021    MCV 91 11/02/2021    MCH 30.0 11/02/2021    MCHC 33.2 11/02/2021     Lab Results   Component Value Date    RDW 12.9 11/02/2021     11/02/2021    MPV 9.3 11/02/2021    GRAN 3.6 11/02/2021    GRAN 65.2 11/02/2021    LYMPH 1.3 11/02/2021    LYMPH 23.7 11/02/2021    MONO 0.5 11/02/2021    MONO 8.2 11/02/2021    EOSINOPHIL 2.0 11/02/2021    BASOPHIL 0.5 11/02/2021    EOS 0.1 11/02/2021    BASO 0.03 11/02/2021    CHOL 173 05/19/2021    TRIG 69 05/19/2021    HDL 74 05/19/2021    CHOLHDL 42.8 05/19/2021    TOTALCHOLEST 2.3 05/19/2021    ALBUMIN 4.3 11/02/2021    BILIDIR 0.1 08/22/2018    AST 21 11/02/2021    AST 36 10/17/2016    ALT 25 11/02/2021    ALT 34 10/17/2016    ALKPHOS 56 11/02/2021    LABPROT 10.2 11/02/2021    INR 1.0 11/02/2021    PSA 1.1 09/17/2020       Assessment and Plan:    Philip Presley is a 61 y.o. male with HCV-induced Cirrhosis  My current recommendations:  1. Compensated cirrhosis: MELD <15: remains medically early for liver transplant. Check meld labs every 6 months. Hepatic lesions, multiple, none on most recent MRI; US 05/21 and 11/21-none- recommend repeat in 6 months  3. HCV SVR: has attained an SVR  4. EGD for variceal screening: given improvement in platelets and no EV on EGD in 2018- no repeat needed      Return 6 months    . . . .

## 2022-08-08 ENCOUNTER — OFFICE VISIT (OUTPATIENT)
Dept: PRIMARY CARE CLINIC | Facility: CLINIC | Age: 62
End: 2022-08-08
Payer: MEDICARE

## 2022-08-08 VITALS
SYSTOLIC BLOOD PRESSURE: 130 MMHG | WEIGHT: 216.38 LBS | HEART RATE: 66 BPM | RESPIRATION RATE: 16 BRPM | BODY MASS INDEX: 27.77 KG/M2 | HEIGHT: 74 IN | DIASTOLIC BLOOD PRESSURE: 80 MMHG | OXYGEN SATURATION: 95 %

## 2022-08-08 DIAGNOSIS — J30.89 NON-SEASONAL ALLERGIC RHINITIS, UNSPECIFIED TRIGGER: Primary | ICD-10-CM

## 2022-08-08 DIAGNOSIS — G47.00 INSOMNIA, UNSPECIFIED TYPE: ICD-10-CM

## 2022-08-08 PROCEDURE — 3008F PR BODY MASS INDEX (BMI) DOCUMENTED: ICD-10-PCS | Mod: CPTII,S$GLB,, | Performed by: FAMILY MEDICINE

## 2022-08-08 PROCEDURE — 3079F DIAST BP 80-89 MM HG: CPT | Mod: CPTII,S$GLB,, | Performed by: FAMILY MEDICINE

## 2022-08-08 PROCEDURE — 3079F PR MOST RECENT DIASTOLIC BLOOD PRESSURE 80-89 MM HG: ICD-10-PCS | Mod: CPTII,S$GLB,, | Performed by: FAMILY MEDICINE

## 2022-08-08 PROCEDURE — 99214 OFFICE O/P EST MOD 30 MIN: CPT | Mod: S$GLB,,, | Performed by: FAMILY MEDICINE

## 2022-08-08 PROCEDURE — 99999 PR PBB SHADOW E&M-EST. PATIENT-LVL III: CPT | Mod: PBBFAC,,, | Performed by: FAMILY MEDICINE

## 2022-08-08 PROCEDURE — 99214 PR OFFICE/OUTPT VISIT, EST, LEVL IV, 30-39 MIN: ICD-10-PCS | Mod: S$GLB,,, | Performed by: FAMILY MEDICINE

## 2022-08-08 PROCEDURE — 99999 PR PBB SHADOW E&M-EST. PATIENT-LVL III: ICD-10-PCS | Mod: PBBFAC,,, | Performed by: FAMILY MEDICINE

## 2022-08-08 PROCEDURE — 3075F SYST BP GE 130 - 139MM HG: CPT | Mod: CPTII,S$GLB,, | Performed by: FAMILY MEDICINE

## 2022-08-08 PROCEDURE — 3008F BODY MASS INDEX DOCD: CPT | Mod: CPTII,S$GLB,, | Performed by: FAMILY MEDICINE

## 2022-08-08 PROCEDURE — 3075F PR MOST RECENT SYSTOLIC BLOOD PRESS GE 130-139MM HG: ICD-10-PCS | Mod: CPTII,S$GLB,, | Performed by: FAMILY MEDICINE

## 2022-08-08 RX ORDER — ZOLPIDEM TARTRATE 12.5 MG/1
12.5 TABLET, FILM COATED, EXTENDED RELEASE ORAL NIGHTLY PRN
Qty: 14 TABLET | Refills: 0 | Status: SHIPPED | OUTPATIENT
Start: 2022-08-08 | End: 2022-08-10 | Stop reason: SDUPTHER

## 2022-08-08 RX ORDER — FLUTICASONE PROPIONATE 50 MCG
1 SPRAY, SUSPENSION (ML) NASAL DAILY
Qty: 16 G | Refills: 1 | Status: SHIPPED | OUTPATIENT
Start: 2022-08-08 | End: 2023-01-17 | Stop reason: SDUPTHER

## 2022-08-08 RX ORDER — LORATADINE 10 MG/1
10 TABLET ORAL DAILY
Qty: 30 TABLET | Refills: 3 | Status: SHIPPED | OUTPATIENT
Start: 2022-08-08 | End: 2022-08-10 | Stop reason: SDUPTHER

## 2022-08-08 RX ORDER — BENZONATATE 100 MG/1
100 CAPSULE ORAL 3 TIMES DAILY PRN
Qty: 30 CAPSULE | Refills: 1 | Status: SHIPPED | OUTPATIENT
Start: 2022-08-08 | End: 2022-08-18

## 2022-08-08 RX ORDER — ZOLPIDEM TARTRATE 12.5 MG/1
12.5 TABLET, FILM COATED, EXTENDED RELEASE ORAL NIGHTLY PRN
Qty: 30 TABLET | Refills: 5 | Status: CANCELLED | OUTPATIENT
Start: 2022-08-08

## 2022-08-08 NOTE — PROGRESS NOTES
"Subjective:       Patient ID: Philip Presley is a 61 y.o. male.    Chief Complaint: Medication Refill, Wheezing, and Sinusitis    Recently treated for a sinus infection a month ago out of town with steroids and antibiotics. Seeing writer for the first time as PCP is out of town. Now with persistent post nasal drip which he attributes to allergies.     Review of Systems    Objective:      Vitals:    08/08/22 1634   BP: 130/80   BP Location: Right arm   Patient Position: Sitting   BP Method: Large (Manual)   Pulse: 66   Resp: 16   SpO2: 95%   Weight: 98.1 kg (216 lb 6.1 oz)   Height: 6' 2" (1.88 m)     Physical Exam  Vitals and nursing note reviewed.   Constitutional:       Appearance: He is well-developed.   HENT:      Head: Normocephalic and atraumatic.      Right Ear: Tympanic membrane and ear canal normal.      Left Ear: Tympanic membrane normal.      Nose: Nose normal.      Mouth/Throat:      Mouth: Mucous membranes are moist.      Pharynx: No oropharyngeal exudate or posterior oropharyngeal erythema.   Eyes:      Extraocular Movements: Extraocular movements intact.      Conjunctiva/sclera: Conjunctivae normal.   Cardiovascular:      Rate and Rhythm: Normal rate and regular rhythm.      Heart sounds: Normal heart sounds.   Pulmonary:      Effort: Pulmonary effort is normal. No respiratory distress.      Breath sounds: Normal breath sounds. No wheezing or rales.   Abdominal:      General: There is no distension.   Neurological:      Mental Status: He is alert.      Cranial Nerves: No cranial nerve deficit.             Lab Results   Component Value Date     05/03/2022    K 5.3 (H) 05/03/2022     05/03/2022    CO2 22 (L) 05/03/2022    CO2 31 (H) 10/17/2016    BUN 22 05/03/2022    CREATININE 1.4 05/03/2022    ANIONGAP 12 05/03/2022     Lab Results   Component Value Date    HGBA1C 5.1 05/19/2021     No results found for: BNP, BNPTRIAGEBLO    Lab Results   Component Value Date    WBC 4.72 05/03/2022    " HGB 16.7 05/03/2022    HCT 49.3 05/03/2022     05/03/2022    GRAN 2.8 05/03/2022    GRAN 59.7 05/03/2022     Lab Results   Component Value Date    CHOL 173 05/19/2021    HDL 74 05/19/2021    LDLCALC 85.2 05/19/2021    TRIG 69 05/19/2021          Current Outpatient Medications:     benzonatate (TESSALON) 100 MG capsule, Take 1 capsule (100 mg total) by mouth 3 (three) times daily as needed for Cough., Disp: 30 capsule, Rfl: 1    fluticasone propionate (FLONASE) 50 mcg/actuation nasal spray, 1 spray (50 mcg total) by Each Nostril route once daily., Disp: 11.1 mL, Rfl: 1    loratadine (CLARITIN) 10 mg tablet, Take 1 tablet (10 mg total) by mouth once daily., Disp: 30 tablet, Rfl: 3    traMADoL (ULTRAM) 50 mg tablet, Take 1 tablet (50 mg total) by mouth every 8 (eight) hours as needed for Pain. (Patient not taking: No sig reported), Disp: 60 tablet, Rfl: 2    zolpidem (AMBIEN CR) 12.5 MG CR tablet, Take 1 tablet (12.5 mg total) by mouth nightly as needed for Insomnia., Disp: 14 tablet, Rfl: 0  No current facility-administered medications for this visit.    Facility-Administered Medications Ordered in Other Visits:     0.9%  NaCl infusion, , Intravenous, Continuous, Wilbur Bee MD, Stopped at 08/28/18 0844    sodium chloride 0.9% flush 3 mL, 3 mL, Intravenous, PRN, Wilbur Bee MD        Assessment:       1. Non-seasonal allergic rhinitis, unspecified trigger    2. Insomnia, unspecified type           Plan:       Non-seasonal allergic rhinitis, unspecified trigger  -     fluticasone propionate (FLONASE) 50 mcg/actuation nasal spray; 1 spray (50 mcg total) by Each Nostril route once daily.  Dispense: 11.1 mL; Refill: 1  Chronic allergy issues. Reviewed mainstays of treatment trial of nasal steroid and claritin    Insomnia, unspecified type  Comments:  continue with ambien for now discuss with pt long term use currently has no other alternative and pt need sleep to avoid exacerbation of his  fibromyalgia  Orders:  -     zolpidem (AMBIEN CR) 12.5 MG CR tablet; Take 1 tablet (12.5 mg total) by mouth nightly as needed for Insomnia.  Dispense: 14 tablet; Refill: 0  Short refill as needs to get controlled substances as a general rule from PCP who is out of town. Reviewed sleep hygiene     Other orders  -     loratadine (CLARITIN) 10 mg tablet; Take 1 tablet (10 mg total) by mouth once daily.  Dispense: 30 tablet; Refill: 3  -     benzonatate (TESSALON) 100 MG capsule; Take 1 capsule (100 mg total) by mouth 3 (three) times daily as needed for Cough.  Dispense: 30 capsule; Refill: 1

## 2022-08-10 ENCOUNTER — OFFICE VISIT (OUTPATIENT)
Dept: PRIMARY CARE CLINIC | Facility: CLINIC | Age: 62
End: 2022-08-10
Payer: MEDICARE

## 2022-08-10 VITALS
RESPIRATION RATE: 16 BRPM | HEART RATE: 69 BPM | WEIGHT: 214.19 LBS | DIASTOLIC BLOOD PRESSURE: 72 MMHG | BODY MASS INDEX: 27.49 KG/M2 | OXYGEN SATURATION: 99 % | HEIGHT: 74 IN | SYSTOLIC BLOOD PRESSURE: 112 MMHG

## 2022-08-10 DIAGNOSIS — M79.7 FIBROMYALGIA: ICD-10-CM

## 2022-08-10 DIAGNOSIS — J30.89 NON-SEASONAL ALLERGIC RHINITIS, UNSPECIFIED TRIGGER: Primary | ICD-10-CM

## 2022-08-10 DIAGNOSIS — E87.5 HYPERKALEMIA: ICD-10-CM

## 2022-08-10 DIAGNOSIS — G47.00 INSOMNIA, UNSPECIFIED TYPE: ICD-10-CM

## 2022-08-10 PROCEDURE — 3078F PR MOST RECENT DIASTOLIC BLOOD PRESSURE < 80 MM HG: ICD-10-PCS | Mod: CPTII,S$GLB,, | Performed by: INTERNAL MEDICINE

## 2022-08-10 PROCEDURE — 3074F SYST BP LT 130 MM HG: CPT | Mod: CPTII,S$GLB,, | Performed by: INTERNAL MEDICINE

## 2022-08-10 PROCEDURE — 1159F PR MEDICATION LIST DOCUMENTED IN MEDICAL RECORD: ICD-10-PCS | Mod: CPTII,S$GLB,, | Performed by: INTERNAL MEDICINE

## 2022-08-10 PROCEDURE — 99999 PR PBB SHADOW E&M-EST. PATIENT-LVL III: ICD-10-PCS | Mod: PBBFAC,,, | Performed by: INTERNAL MEDICINE

## 2022-08-10 PROCEDURE — 3008F BODY MASS INDEX DOCD: CPT | Mod: CPTII,S$GLB,, | Performed by: INTERNAL MEDICINE

## 2022-08-10 PROCEDURE — 3078F DIAST BP <80 MM HG: CPT | Mod: CPTII,S$GLB,, | Performed by: INTERNAL MEDICINE

## 2022-08-10 PROCEDURE — 3008F PR BODY MASS INDEX (BMI) DOCUMENTED: ICD-10-PCS | Mod: CPTII,S$GLB,, | Performed by: INTERNAL MEDICINE

## 2022-08-10 PROCEDURE — 99999 PR PBB SHADOW E&M-EST. PATIENT-LVL III: CPT | Mod: PBBFAC,,, | Performed by: INTERNAL MEDICINE

## 2022-08-10 PROCEDURE — 1160F RVW MEDS BY RX/DR IN RCRD: CPT | Mod: CPTII,S$GLB,, | Performed by: INTERNAL MEDICINE

## 2022-08-10 PROCEDURE — 99213 PR OFFICE/OUTPT VISIT, EST, LEVL III, 20-29 MIN: ICD-10-PCS | Mod: S$GLB,,, | Performed by: INTERNAL MEDICINE

## 2022-08-10 PROCEDURE — 1160F PR REVIEW ALL MEDS BY PRESCRIBER/CLIN PHARMACIST DOCUMENTED: ICD-10-PCS | Mod: CPTII,S$GLB,, | Performed by: INTERNAL MEDICINE

## 2022-08-10 PROCEDURE — 99213 OFFICE O/P EST LOW 20 MIN: CPT | Mod: S$GLB,,, | Performed by: INTERNAL MEDICINE

## 2022-08-10 PROCEDURE — 3074F PR MOST RECENT SYSTOLIC BLOOD PRESSURE < 130 MM HG: ICD-10-PCS | Mod: CPTII,S$GLB,, | Performed by: INTERNAL MEDICINE

## 2022-08-10 PROCEDURE — 1159F MED LIST DOCD IN RCRD: CPT | Mod: CPTII,S$GLB,, | Performed by: INTERNAL MEDICINE

## 2022-08-10 RX ORDER — LORATADINE 10 MG/1
10 TABLET ORAL DAILY
Qty: 30 TABLET | Refills: 3 | Status: SHIPPED | OUTPATIENT
Start: 2022-08-10 | End: 2023-01-17 | Stop reason: SDUPTHER

## 2022-08-10 RX ORDER — ZOLPIDEM TARTRATE 12.5 MG/1
12.5 TABLET, FILM COATED, EXTENDED RELEASE ORAL NIGHTLY PRN
Qty: 30 TABLET | Refills: 5 | Status: SHIPPED | OUTPATIENT
Start: 2022-08-10 | End: 2023-01-17 | Stop reason: SDUPTHER

## 2022-08-10 NOTE — PROGRESS NOTES
Subjective:       Patient ID: Philip Presley is a 61 y.o. male.    Chief Complaint: Follow-up and Medication Refill    HPI    Pt visit today for f/u and medication refill he was out of town to visit his mom who is sick pt had hay fever then sinuses infection just fish steroid and po abx still with some congestion but better with claritin he wsa out of ambien x 10 days only sleep < hrs and make his fibromyalgia worse no sob cp JHAVERI fever chill . Review lasb has sl high KCL need repeat  Review of Systems    Objective:      Physical Exam  Vitals and nursing note reviewed.   Constitutional:       General: He is not in acute distress.     Appearance: He is well-developed.   HENT:      Head: Normocephalic and atraumatic.      Right Ear: External ear normal.      Left Ear: External ear normal.      Nose: Nose normal.      Mouth/Throat:      Pharynx: No oropharyngeal exudate.   Eyes:      Extraocular Movements: Extraocular movements intact.      Conjunctiva/sclera: Conjunctivae normal.      Pupils: Pupils are equal, round, and reactive to light.   Neck:      Thyroid: No thyromegaly.   Cardiovascular:      Rate and Rhythm: Normal rate and regular rhythm.      Heart sounds: Normal heart sounds. No murmur heard.    No friction rub. No gallop.   Pulmonary:      Effort: Pulmonary effort is normal. No respiratory distress.      Breath sounds: Normal breath sounds. No wheezing.   Abdominal:      General: Bowel sounds are normal. There is no distension.      Palpations: Abdomen is soft.      Tenderness: There is no abdominal tenderness.   Musculoskeletal:         General: No tenderness or deformity. Normal range of motion.      Cervical back: Normal range of motion and neck supple.   Lymphadenopathy:      Cervical: No cervical adenopathy.   Skin:     General: Skin is warm and dry.      Findings: No erythema or rash.   Neurological:      Mental Status: He is alert and oriented to person, place, and time.   Psychiatric:          Thought Content: Thought content normal.         Judgment: Judgment normal.         Assessment:       1. Non-seasonal allergic rhinitis, unspecified trigger    2. Insomnia, unspecified type    3. Hyperkalemia    4. Fibromyalgia        Plan:       Non-seasonal allergic rhinitis, unspecified trigger  -     loratadine (CLARITIN) 10 mg tablet; Take 1 tablet (10 mg total) by mouth once daily.  Dispense: 30 tablet; Refill: 3    Insomnia, unspecified type  Comments:  continue with ambien for now discuss with pt long term use currently has no other alternative and pt need sleep to avoid exacerbation of his fibromyalgia  Orders:  -     zolpidem (AMBIEN CR) 12.5 MG CR tablet; Take 1 tablet (12.5 mg total) by mouth nightly as needed for Insomnia.  Dispense: 30 tablet; Refill: 5    Hyperkalemia  -     Basic Metabolic Panel; Future; Expected date: 08/10/2022    Fibromyalgia  Comments:  continue with me PT and pain med prn when flare up        Medication List with Changes/Refills   Current Medications    BENZONATATE (TESSALON) 100 MG CAPSULE    Take 1 capsule (100 mg total) by mouth 3 (three) times daily as needed for Cough.    FLUTICASONE PROPIONATE (FLONASE) 50 MCG/ACTUATION NASAL SPRAY    1 spray (50 mcg total) by Each Nostril route once daily.    TRAMADOL (ULTRAM) 50 MG TABLET    Take 1 tablet (50 mg total) by mouth every 8 (eight) hours as needed for Pain.   Changed and/or Refilled Medications    Modified Medication Previous Medication    LORATADINE (CLARITIN) 10 MG TABLET loratadine (CLARITIN) 10 mg tablet       Take 1 tablet (10 mg total) by mouth once daily.    Take 1 tablet (10 mg total) by mouth once daily.    ZOLPIDEM (AMBIEN CR) 12.5 MG CR TABLET zolpidem (AMBIEN CR) 12.5 MG CR tablet       Take 1 tablet (12.5 mg total) by mouth nightly as needed for Insomnia.    Take 1 tablet (12.5 mg total) by mouth nightly as needed for Insomnia.

## 2022-08-11 ENCOUNTER — TELEPHONE (OUTPATIENT)
Dept: PRIMARY CARE CLINIC | Facility: CLINIC | Age: 62
End: 2022-08-11
Payer: MEDICARE

## 2023-01-12 ENCOUNTER — PATIENT OUTREACH (OUTPATIENT)
Dept: ADMINISTRATIVE | Facility: HOSPITAL | Age: 63
End: 2023-01-12
Payer: MEDICARE

## 2023-01-12 NOTE — PROGRESS NOTES
Health Maintenance Due   Topic Date Due    COVID-19 Vaccine (2 - Booster for Ehsan series) 12/29/2021    Lipid Panel  05/19/2022    Influenza Vaccine (1) Never done     Chart review done. HM updated. Immunizations reviewed & updated. Care Everywhere updated.

## 2023-01-17 ENCOUNTER — OFFICE VISIT (OUTPATIENT)
Dept: PRIMARY CARE CLINIC | Facility: CLINIC | Age: 63
End: 2023-01-17
Payer: MEDICARE

## 2023-01-17 VITALS
SYSTOLIC BLOOD PRESSURE: 110 MMHG | HEIGHT: 74 IN | RESPIRATION RATE: 18 BRPM | DIASTOLIC BLOOD PRESSURE: 78 MMHG | OXYGEN SATURATION: 97 % | HEART RATE: 55 BPM | TEMPERATURE: 98 F | WEIGHT: 214.75 LBS | BODY MASS INDEX: 27.56 KG/M2

## 2023-01-17 DIAGNOSIS — Z13.6 ENCOUNTER FOR LIPID SCREENING FOR CARDIOVASCULAR DISEASE: ICD-10-CM

## 2023-01-17 DIAGNOSIS — G89.29 CHRONIC MIDLINE LOW BACK PAIN WITHOUT SCIATICA: Primary | ICD-10-CM

## 2023-01-17 DIAGNOSIS — M79.7 FIBROMYALGIA: ICD-10-CM

## 2023-01-17 DIAGNOSIS — J30.89 NON-SEASONAL ALLERGIC RHINITIS, UNSPECIFIED TRIGGER: ICD-10-CM

## 2023-01-17 DIAGNOSIS — K74.60 HEPATIC CIRRHOSIS, UNSPECIFIED HEPATIC CIRRHOSIS TYPE, UNSPECIFIED WHETHER ASCITES PRESENT: ICD-10-CM

## 2023-01-17 DIAGNOSIS — M54.50 CHRONIC MIDLINE LOW BACK PAIN WITHOUT SCIATICA: Primary | ICD-10-CM

## 2023-01-17 DIAGNOSIS — Z12.5 SCREENING FOR PROSTATE CANCER: ICD-10-CM

## 2023-01-17 DIAGNOSIS — Z13.220 ENCOUNTER FOR LIPID SCREENING FOR CARDIOVASCULAR DISEASE: ICD-10-CM

## 2023-01-17 DIAGNOSIS — G47.00 INSOMNIA, UNSPECIFIED TYPE: ICD-10-CM

## 2023-01-17 PROCEDURE — 1159F MED LIST DOCD IN RCRD: CPT | Mod: CPTII,S$GLB,, | Performed by: INTERNAL MEDICINE

## 2023-01-17 PROCEDURE — 3008F BODY MASS INDEX DOCD: CPT | Mod: CPTII,S$GLB,, | Performed by: INTERNAL MEDICINE

## 2023-01-17 PROCEDURE — 3074F SYST BP LT 130 MM HG: CPT | Mod: CPTII,S$GLB,, | Performed by: INTERNAL MEDICINE

## 2023-01-17 PROCEDURE — 99999 PR PBB SHADOW E&M-EST. PATIENT-LVL III: CPT | Mod: PBBFAC,,, | Performed by: INTERNAL MEDICINE

## 2023-01-17 PROCEDURE — 3008F PR BODY MASS INDEX (BMI) DOCUMENTED: ICD-10-PCS | Mod: CPTII,S$GLB,, | Performed by: INTERNAL MEDICINE

## 2023-01-17 PROCEDURE — 99999 PR PBB SHADOW E&M-EST. PATIENT-LVL III: ICD-10-PCS | Mod: PBBFAC,,, | Performed by: INTERNAL MEDICINE

## 2023-01-17 PROCEDURE — 1159F PR MEDICATION LIST DOCUMENTED IN MEDICAL RECORD: ICD-10-PCS | Mod: CPTII,S$GLB,, | Performed by: INTERNAL MEDICINE

## 2023-01-17 PROCEDURE — 1160F RVW MEDS BY RX/DR IN RCRD: CPT | Mod: CPTII,S$GLB,, | Performed by: INTERNAL MEDICINE

## 2023-01-17 PROCEDURE — 3078F DIAST BP <80 MM HG: CPT | Mod: CPTII,S$GLB,, | Performed by: INTERNAL MEDICINE

## 2023-01-17 PROCEDURE — 99214 OFFICE O/P EST MOD 30 MIN: CPT | Mod: S$GLB,,, | Performed by: INTERNAL MEDICINE

## 2023-01-17 PROCEDURE — 3074F PR MOST RECENT SYSTOLIC BLOOD PRESSURE < 130 MM HG: ICD-10-PCS | Mod: CPTII,S$GLB,, | Performed by: INTERNAL MEDICINE

## 2023-01-17 PROCEDURE — 3078F PR MOST RECENT DIASTOLIC BLOOD PRESSURE < 80 MM HG: ICD-10-PCS | Mod: CPTII,S$GLB,, | Performed by: INTERNAL MEDICINE

## 2023-01-17 PROCEDURE — 1160F PR REVIEW ALL MEDS BY PRESCRIBER/CLIN PHARMACIST DOCUMENTED: ICD-10-PCS | Mod: CPTII,S$GLB,, | Performed by: INTERNAL MEDICINE

## 2023-01-17 PROCEDURE — 99214 PR OFFICE/OUTPT VISIT, EST, LEVL IV, 30-39 MIN: ICD-10-PCS | Mod: S$GLB,,, | Performed by: INTERNAL MEDICINE

## 2023-01-17 RX ORDER — FLUTICASONE PROPIONATE 50 MCG
1 SPRAY, SUSPENSION (ML) NASAL DAILY
Qty: 16 G | Refills: 5 | Status: SHIPPED | OUTPATIENT
Start: 2023-01-17 | End: 2024-01-30

## 2023-01-17 RX ORDER — LORATADINE 10 MG/1
10 TABLET ORAL DAILY
Qty: 90 TABLET | Refills: 3 | Status: SHIPPED | OUTPATIENT
Start: 2023-01-17 | End: 2024-01-17

## 2023-01-17 RX ORDER — TRAMADOL HYDROCHLORIDE 50 MG/1
50 TABLET ORAL EVERY 8 HOURS PRN
Qty: 60 TABLET | Refills: 3 | Status: SHIPPED | OUTPATIENT
Start: 2023-01-17 | End: 2023-07-17 | Stop reason: SDUPTHER

## 2023-01-17 RX ORDER — ZOLPIDEM TARTRATE 12.5 MG/1
12.5 TABLET, FILM COATED, EXTENDED RELEASE ORAL NIGHTLY PRN
Qty: 30 TABLET | Refills: 5 | Status: SHIPPED | OUTPATIENT
Start: 2023-01-17 | End: 2023-07-17 | Stop reason: SDUPTHER

## 2023-01-18 NOTE — PROGRESS NOTES
Subjective:       Patient ID: Philip Presley is a 62 y.o. male.    Chief Complaint: Follow-up (Check up and med refill) and Insect Bite (On back)    HPI  patient visit today for routine follow-up he has history of fibromyalgia insomnia chronic lower back pain from lumbar spine disc disease and history of liver cirrhosis from hepatitis-C infection status post treatment and the hepatitis-C infection resolved he also have history of low blood loss but has been resolved too he denies short of breath chest pain dyspnea with exertion his taking tramadol for fibromyalgia and lower back pain as needed twice a day and has been on Ambien for insomnia for many years before patient came to our office he deny anxiety depression  Review of Systems    Objective:      Physical Exam  Vitals and nursing note reviewed.   Constitutional:       General: He is not in acute distress.     Appearance: He is well-developed.   HENT:      Head: Normocephalic and atraumatic.      Right Ear: External ear normal.      Left Ear: External ear normal.      Nose: Nose normal.   Eyes:      Conjunctiva/sclera: Conjunctivae normal.      Pupils: Pupils are equal, round, and reactive to light.   Neck:      Thyroid: No thyromegaly.   Cardiovascular:      Rate and Rhythm: Normal rate and regular rhythm.      Heart sounds: Normal heart sounds. No murmur heard.    No friction rub. No gallop.   Pulmonary:      Effort: Pulmonary effort is normal. No respiratory distress.      Breath sounds: Normal breath sounds. No wheezing.   Abdominal:      General: Bowel sounds are normal. There is no distension.      Palpations: Abdomen is soft.      Tenderness: There is no abdominal tenderness.   Musculoskeletal:         General: Tenderness (Subjective tenderness in the lower back and diffuse muscle tendon tenderness) present. No deformity. Normal range of motion.      Cervical back: Normal range of motion and neck supple.   Lymphadenopathy:      Cervical: No cervical  adenopathy.   Skin:     General: Skin is warm and dry.      Findings: No erythema or rash.   Neurological:      Mental Status: He is alert and oriented to person, place, and time.   Psychiatric:         Thought Content: Thought content normal.         Judgment: Judgment normal.       Assessment:       1. Chronic midline low back pain without sciatica    2. Non-seasonal allergic rhinitis, unspecified trigger    3. Fibromyalgia    4. Insomnia, unspecified type    5. Encounter for lipid screening for cardiovascular disease    6. Screening for prostate cancer    7. Hepatic cirrhosis, unspecified hepatic cirrhosis type, unspecified whether ascites present          Plan:       Chronic midline low back pain without sciatica  -     traMADoL (ULTRAM) 50 mg tablet; Take 1 tablet (50 mg total) by mouth every 8 (eight) hours as needed for Pain.  Dispense: 60 tablet; Refill: 3    Non-seasonal allergic rhinitis, unspecified trigger  -     fluticasone propionate (FLONASE) 50 mcg/actuation nasal spray; 1 spray (50 mcg total) by Each Nostril route once daily.  Dispense: 16 g; Refill: 5  -     loratadine (CLARITIN) 10 mg tablet; Take 1 tablet (10 mg total) by mouth once daily.  Dispense: 90 tablet; Refill: 3    Fibromyalgia  Comments:  continue with light physical activity consider PT if pt desires and try avoid chronic opiate use continue withlyrica and muscle relaxant  Orders:  -     traMADoL (ULTRAM) 50 mg tablet; Take 1 tablet (50 mg total) by mouth every 8 (eight) hours as needed for Pain.  Dispense: 60 tablet; Refill: 3  -     CBC Auto Differential; Future; Expected date: 01/17/2023  -     Comprehensive Metabolic Panel; Future; Expected date: 01/17/2023  -     Urinalysis; Future; Expected date: 01/17/2023  -     TSH; Future; Expected date: 01/17/2023    Insomnia, unspecified type  Comments:  continue with ambien for now discuss with pt long term use currently has no other alternative and pt need sleep to avoid exacerbation of  his fibromyalgia  Orders:  -     zolpidem (AMBIEN CR) 12.5 MG CR tablet; Take 1 tablet (12.5 mg total) by mouth nightly as needed for Insomnia.  Dispense: 30 tablet; Refill: 5    Encounter for lipid screening for cardiovascular disease    Screening for prostate cancer  -     PSA, Screening; Future; Expected date: 01/17/2023    Hepatic cirrhosis, unspecified hepatic cirrhosis type, unspecified whether ascites present  Comments:  Currently stable hepatitis-C is resolved patient deny any alcohol use will continue to monitor        Medication List with Changes/Refills   Changed and/or Refilled Medications    Modified Medication Previous Medication    FLUTICASONE PROPIONATE (FLONASE) 50 MCG/ACTUATION NASAL SPRAY fluticasone propionate (FLONASE) 50 mcg/actuation nasal spray       1 spray (50 mcg total) by Each Nostril route once daily.    1 spray (50 mcg total) by Each Nostril route once daily.    LORATADINE (CLARITIN) 10 MG TABLET loratadine (CLARITIN) 10 mg tablet       Take 1 tablet (10 mg total) by mouth once daily.    Take 1 tablet (10 mg total) by mouth once daily.    TRAMADOL (ULTRAM) 50 MG TABLET traMADoL (ULTRAM) 50 mg tablet       Take 1 tablet (50 mg total) by mouth every 8 (eight) hours as needed for Pain.    Take 1 tablet (50 mg total) by mouth every 8 (eight) hours as needed for Pain.    ZOLPIDEM (AMBIEN CR) 12.5 MG CR TABLET zolpidem (AMBIEN CR) 12.5 MG CR tablet       Take 1 tablet (12.5 mg total) by mouth nightly as needed for Insomnia.    Take 1 tablet (12.5 mg total) by mouth nightly as needed for Insomnia.

## 2023-01-18 NOTE — PROGRESS NOTES
Subjective:       Patient ID: Philip Presley is a 62 y.o. male.    Chief Complaint: Follow-up (Check up and med refill) and Insect Bite (On back)    HPI  patient visit today for routine follow-up refill medication he has history of fibromyalgia with chronic pain lumbar spine disc disease with chronic lower back pain with sciatica any chronic insomnia has been on Ambien for many years prior to coming to our office patient request refill medication he also have history of liver cirrhosis from hepatitis-C infection which has been treated successfully he has low platelets but that has been resolved he denies short of breath chest pain dyspnea with exertion deny anxiety depression he had abdominal ultrasound does show fatty liver he has borderline creatinine 1.4 need follow-up  Review of Systems    Objective:      Physical Exam  Vitals and nursing note reviewed.   Constitutional:       General: He is not in acute distress.     Appearance: He is well-developed.   HENT:      Head: Normocephalic and atraumatic.      Right Ear: External ear normal.      Left Ear: External ear normal.      Nose: Nose normal.      Mouth/Throat:      Pharynx: No oropharyngeal exudate.   Eyes:      Conjunctiva/sclera: Conjunctivae normal.      Pupils: Pupils are equal, round, and reactive to light.   Neck:      Thyroid: No thyromegaly.   Cardiovascular:      Rate and Rhythm: Normal rate and regular rhythm.      Heart sounds: Normal heart sounds. No murmur heard.    No friction rub. No gallop.   Pulmonary:      Effort: Pulmonary effort is normal. No respiratory distress.      Breath sounds: Normal breath sounds. No wheezing.   Abdominal:      General: Bowel sounds are normal. There is no distension.      Palpations: Abdomen is soft.      Tenderness: There is no abdominal tenderness.   Musculoskeletal:         General: Tenderness (Subjective tenderness across lower back and diffuse muscle tendon tenderness bilaterally) present. No deformity.  Normal range of motion.      Cervical back: Normal range of motion and neck supple.   Lymphadenopathy:      Cervical: No cervical adenopathy.   Skin:     General: Skin is warm and dry.      Findings: No erythema or rash.   Neurological:      Mental Status: He is alert and oriented to person, place, and time.   Psychiatric:         Mood and Affect: Mood normal.         Thought Content: Thought content normal.         Judgment: Judgment normal.       Assessment:       1. Chronic midline low back pain without sciatica    2. Non-seasonal allergic rhinitis, unspecified trigger    3. Fibromyalgia    4. Insomnia, unspecified type    5. Encounter for lipid screening for cardiovascular disease    6. Screening for prostate cancer    7. Hepatic cirrhosis, unspecified hepatic cirrhosis type, unspecified whether ascites present          Plan:       Chronic midline low back pain without sciatica  -     traMADoL (ULTRAM) 50 mg tablet; Take 1 tablet (50 mg total) by mouth every 8 (eight) hours as needed for Pain.  Dispense: 60 tablet; Refill: 3    Non-seasonal allergic rhinitis, unspecified trigger  -     fluticasone propionate (FLONASE) 50 mcg/actuation nasal spray; 1 spray (50 mcg total) by Each Nostril route once daily.  Dispense: 16 g; Refill: 5  -     loratadine (CLARITIN) 10 mg tablet; Take 1 tablet (10 mg total) by mouth once daily.  Dispense: 90 tablet; Refill: 3    Fibromyalgia  Comments:  continue with light physical activity consider PT if pt desires and try avoid chronic opiate use continue withlyrica and muscle relaxant  Orders:  -     traMADoL (ULTRAM) 50 mg tablet; Take 1 tablet (50 mg total) by mouth every 8 (eight) hours as needed for Pain.  Dispense: 60 tablet; Refill: 3  -     CBC Auto Differential; Future; Expected date: 01/17/2023  -     Comprehensive Metabolic Panel; Future; Expected date: 01/17/2023  -     Urinalysis; Future; Expected date: 01/17/2023  -     TSH; Future; Expected date: 01/17/2023    Insomnia,  unspecified type  Comments:  continue with ambien for now discuss with pt long term use currently has no other alternative and pt need sleep to avoid exacerbation of his fibromyalgia  Orders:  -     zolpidem (AMBIEN CR) 12.5 MG CR tablet; Take 1 tablet (12.5 mg total) by mouth nightly as needed for Insomnia.  Dispense: 30 tablet; Refill: 5    Encounter for lipid screening for cardiovascular disease    Screening for prostate cancer  -     PSA, Screening; Future; Expected date: 01/17/2023    Hepatic cirrhosis, unspecified hepatic cirrhosis type, unspecified whether ascites present  Comments:  Currently stable hepatitis-C is resolved patient deny any alcohol use will continue to monitor thrombocytopenia resolved        Medication List with Changes/Refills   Changed and/or Refilled Medications    Modified Medication Previous Medication    FLUTICASONE PROPIONATE (FLONASE) 50 MCG/ACTUATION NASAL SPRAY fluticasone propionate (FLONASE) 50 mcg/actuation nasal spray       1 spray (50 mcg total) by Each Nostril route once daily.    1 spray (50 mcg total) by Each Nostril route once daily.    LORATADINE (CLARITIN) 10 MG TABLET loratadine (CLARITIN) 10 mg tablet       Take 1 tablet (10 mg total) by mouth once daily.    Take 1 tablet (10 mg total) by mouth once daily.    TRAMADOL (ULTRAM) 50 MG TABLET traMADoL (ULTRAM) 50 mg tablet       Take 1 tablet (50 mg total) by mouth every 8 (eight) hours as needed for Pain.    Take 1 tablet (50 mg total) by mouth every 8 (eight) hours as needed for Pain.    ZOLPIDEM (AMBIEN CR) 12.5 MG CR TABLET zolpidem (AMBIEN CR) 12.5 MG CR tablet       Take 1 tablet (12.5 mg total) by mouth nightly as needed for Insomnia.    Take 1 tablet (12.5 mg total) by mouth nightly as needed for Insomnia.

## 2023-04-13 ENCOUNTER — PATIENT MESSAGE (OUTPATIENT)
Dept: HEPATOLOGY | Facility: CLINIC | Age: 63
End: 2023-04-13
Payer: MEDICARE

## 2023-04-13 ENCOUNTER — TELEPHONE (OUTPATIENT)
Dept: HEPATOLOGY | Facility: CLINIC | Age: 63
End: 2023-04-13
Payer: MEDICARE

## 2023-04-13 DIAGNOSIS — K74.69 OTHER CIRRHOSIS OF LIVER: Primary | ICD-10-CM

## 2023-04-13 NOTE — TELEPHONE ENCOUNTER
Returned call and scheduled for Sunday at 1030----- Message from Margarita Yeh sent at 4/13/2023  3:20 PM CDT -----  Regarding: Consult/Advisory      Name Of Caller: Philip Presley      Contact Preference: [phone] (home)      Nature of call: Patient calling to confirm he could be here at 7 AM tomorrow morning. Requesting a call back to confirm.

## 2023-04-16 ENCOUNTER — HOSPITAL ENCOUNTER (OUTPATIENT)
Dept: RADIOLOGY | Facility: HOSPITAL | Age: 63
Discharge: HOME | End: 2023-04-16
Attending: INTERNAL MEDICINE

## 2023-04-16 DIAGNOSIS — K74.69 OTHER CIRRHOSIS OF LIVER: ICD-10-CM

## 2023-04-16 PROCEDURE — 74183 MRI ABD W/O CNTR FLWD CNTR: CPT | Mod: TC

## 2023-04-16 PROCEDURE — 74183 MRI ABD W/O CNTR FLWD CNTR: CPT | Mod: 26,,, | Performed by: INTERNAL MEDICINE

## 2023-04-16 PROCEDURE — 25500020 PHARM REV CODE 255: Performed by: INTERNAL MEDICINE

## 2023-04-16 PROCEDURE — A9585 GADOBUTROL INJECTION: HCPCS | Performed by: INTERNAL MEDICINE

## 2023-04-16 PROCEDURE — 74183 MRI ABDOMEN W WO CONTRAST: ICD-10-PCS | Mod: 26,,, | Performed by: INTERNAL MEDICINE

## 2023-04-16 RX ORDER — GADOBUTROL 604.72 MG/ML
10 INJECTION INTRAVENOUS
Status: COMPLETED | OUTPATIENT
Start: 2023-04-16 | End: 2023-04-16

## 2023-04-16 RX ADMIN — GADOBUTROL 10 ML: 604.72 INJECTION INTRAVENOUS at 10:04

## 2023-04-17 ENCOUNTER — PATIENT MESSAGE (OUTPATIENT)
Dept: HEPATOLOGY | Facility: CLINIC | Age: 63
End: 2023-04-17
Payer: MEDICARE

## 2023-04-17 DIAGNOSIS — K74.69 OTHER CIRRHOSIS OF LIVER: Primary | ICD-10-CM

## 2023-04-17 DIAGNOSIS — K74.60 CIRRHOSIS OF LIVER WITHOUT ASCITES, UNSPECIFIED HEPATIC CIRRHOSIS TYPE: Primary | ICD-10-CM

## 2023-04-18 ENCOUNTER — TELEPHONE (OUTPATIENT)
Dept: HEPATOLOGY | Facility: CLINIC | Age: 63
End: 2023-04-18

## 2023-04-18 ENCOUNTER — OFFICE VISIT (OUTPATIENT)
Dept: HEPATOLOGY | Facility: CLINIC | Age: 63
End: 2023-04-18
Payer: MEDICARE

## 2023-04-18 VITALS
WEIGHT: 212.94 LBS | BODY MASS INDEX: 27.33 KG/M2 | HEIGHT: 74 IN | HEART RATE: 63 BPM | DIASTOLIC BLOOD PRESSURE: 71 MMHG | RESPIRATION RATE: 19 BRPM | TEMPERATURE: 97 F | SYSTOLIC BLOOD PRESSURE: 120 MMHG | OXYGEN SATURATION: 98 %

## 2023-04-18 DIAGNOSIS — K74.69 OTHER CIRRHOSIS OF LIVER: Primary | ICD-10-CM

## 2023-04-18 DIAGNOSIS — K76.9 LIVER LESION: ICD-10-CM

## 2023-04-18 PROCEDURE — 3008F BODY MASS INDEX DOCD: CPT | Mod: CPTII,S$GLB,, | Performed by: INTERNAL MEDICINE

## 2023-04-18 PROCEDURE — 99214 OFFICE O/P EST MOD 30 MIN: CPT | Mod: S$GLB,,, | Performed by: INTERNAL MEDICINE

## 2023-04-18 PROCEDURE — 99214 PR OFFICE/OUTPT VISIT, EST, LEVL IV, 30-39 MIN: ICD-10-PCS | Mod: S$GLB,,, | Performed by: INTERNAL MEDICINE

## 2023-04-18 PROCEDURE — 99999 PR PBB SHADOW E&M-EST. PATIENT-LVL IV: ICD-10-PCS | Mod: PBBFAC,,, | Performed by: INTERNAL MEDICINE

## 2023-04-18 PROCEDURE — 3074F SYST BP LT 130 MM HG: CPT | Mod: CPTII,S$GLB,, | Performed by: INTERNAL MEDICINE

## 2023-04-18 PROCEDURE — 3074F PR MOST RECENT SYSTOLIC BLOOD PRESSURE < 130 MM HG: ICD-10-PCS | Mod: CPTII,S$GLB,, | Performed by: INTERNAL MEDICINE

## 2023-04-18 PROCEDURE — 3078F PR MOST RECENT DIASTOLIC BLOOD PRESSURE < 80 MM HG: ICD-10-PCS | Mod: CPTII,S$GLB,, | Performed by: INTERNAL MEDICINE

## 2023-04-18 PROCEDURE — 3078F DIAST BP <80 MM HG: CPT | Mod: CPTII,S$GLB,, | Performed by: INTERNAL MEDICINE

## 2023-04-18 PROCEDURE — 3008F PR BODY MASS INDEX (BMI) DOCUMENTED: ICD-10-PCS | Mod: CPTII,S$GLB,, | Performed by: INTERNAL MEDICINE

## 2023-04-18 PROCEDURE — 99999 PR PBB SHADOW E&M-EST. PATIENT-LVL IV: CPT | Mod: PBBFAC,,, | Performed by: INTERNAL MEDICINE

## 2023-04-18 PROCEDURE — 1159F MED LIST DOCD IN RCRD: CPT | Mod: CPTII,S$GLB,, | Performed by: INTERNAL MEDICINE

## 2023-04-18 PROCEDURE — 1159F PR MEDICATION LIST DOCUMENTED IN MEDICAL RECORD: ICD-10-PCS | Mod: CPTII,S$GLB,, | Performed by: INTERNAL MEDICINE

## 2023-04-18 RX ORDER — CHLORZOXAZONE 250 MG/1
250 TABLET ORAL 4 TIMES DAILY PRN
COMMUNITY
End: 2023-07-17 | Stop reason: SDUPTHER

## 2023-04-18 NOTE — PROGRESS NOTES
HEPATOLOGY FOLLOW UP    Referring Physician: Remy Wang MD    Current Corresponding Physician: Remy Wang MD    Philip Presley is here for follow up of HCV-induced cirrhosis.     HPI   Philip Presley has previously been treated with harvoni and achieved an SVR12 HCV VL last checked 11/20/18 and was still negative; hx of liver biopsy, F3 but had thrombocytopenia (now improved > 200).    HE: none  Ascites: none  EV:  No varices on EGD 08/18.    He is feeling well. He denies N/V, abdominal pain or diarrhea. No longer seeing rheumatology due to dx of fibromyalgia. Pt referred back to PCP for management. Likely had covid in Aug 2021.    Last labs 4/17/23: ALT 37, AST 30, AFP 2.4  Abdo US 4/13/23: Liver: 12.6 cm, normal in size. Nodular contour.  Echogenic liver parenchyma.  There are several geographic hypoechoic lesions measuring up to 2.0 cm in the left hepatic lobe and 1.5 cm in the right hepatic lobe, similar in size and distribution compared to 12/01/2022.  No internal Doppler signal.  No new focal lesion.  MRI abdo w wo contrast 4/16/23: No MR correlate for the liver lesions described on recent ultrasound; Unchanged 8 mm lesion in the right hepatic lobe, likely a hemangioma; Numerous additional arterial enhancing lesions scattered throughout the liver measuring up to 1.7 cm.  Some satisfy threshold growth criteria, although this may be due to differences in timing of the contrast bolus.  None demonstrate washout or pseudo capsule.  These remain indeterminate for malignancy, and continued follow-up is recommended.    MELD-Na score: 10 at 4/17/2023  5:46 PM  MELD score: 10 at 4/17/2023  5:46 PM  Calculated from:  Serum Creatinine: 1.4 mg/dL at 4/17/2023  5:46 PM  Serum Sodium: 139 mmol/L (Using max of 137 mmol/L) at 4/17/2023  5:46 PM  Total Bilirubin: 0.7 mg/dL (Using min of 1 mg/dL) at 4/17/2023  5:46 PM  INR(ratio): 1.0 at 4/17/2023  5:46 PM  Age: 62 years  Plts have improved to 239 (<150 in 2016)    MRI  11/19:  No MRI evidence for focal hepatic abnormality. Continued follow-up is recommended as clinically necessary..      Outpatient Encounter Medications as of 4/18/2023   Medication Sig Dispense Refill    chlorzoxazone (PARAFON FORTE) 250 MG tablet Take 250 mg by mouth 4 (four) times daily as needed for Muscle spasms.      fluticasone propionate (FLONASE) 50 mcg/actuation nasal spray 1 spray (50 mcg total) by Each Nostril route once daily. 16 g 5    loratadine (CLARITIN) 10 mg tablet Take 1 tablet (10 mg total) by mouth once daily. 90 tablet 3    traMADoL (ULTRAM) 50 mg tablet Take 1 tablet (50 mg total) by mouth every 8 (eight) hours as needed for Pain. 60 tablet 3    zolpidem (AMBIEN CR) 12.5 MG CR tablet Take 1 tablet (12.5 mg total) by mouth nightly as needed for Insomnia. 30 tablet 5     Facility-Administered Encounter Medications as of 4/18/2023   Medication Dose Route Frequency Provider Last Rate Last Admin    0.9%  NaCl infusion   Intravenous Continuous Wilbur Bee MD   Stopped at 08/28/18 0844    sodium chloride 0.9% flush 3 mL  3 mL Intravenous PRN Wilbur Bee MD         Review of patient's allergies indicates:   Allergen Reactions    Penicillins Hives     Reaction to it when pt was a baby.     Past Medical History:   Diagnosis Date    Cirrhosis     Fibromyalgia     History of hepatitis C 10/28/2015    Treated with Harvoni x 24 wks, SVR 24 10/2016     Vitiligo 11/19/2017       Review of Systems   Constitutional: Negative.    HENT: Negative.     Eyes: Negative.    Respiratory: Negative.     Cardiovascular: Negative.    Gastrointestinal: Negative.    Genitourinary: Negative.    Musculoskeletal: Negative.    Skin: Negative.    Neurological: Negative.    Psychiatric/Behavioral: Negative.     Vitals:    04/18/23 1543   BP: 120/71   Pulse: 63   Resp: 19   Temp: 96.9 °F (36.1 °C)       Physical Exam  Vitals reviewed.   Constitutional:       Appearance: He is well-developed.   HENT:      Head: Normocephalic and  atraumatic.   Eyes:      General: No scleral icterus.     Conjunctiva/sclera: Conjunctivae normal.      Pupils: Pupils are equal, round, and reactive to light.   Neck:      Thyroid: No thyromegaly.   Cardiovascular:      Rate and Rhythm: Normal rate and regular rhythm.      Heart sounds: Normal heart sounds.   Pulmonary:      Effort: Pulmonary effort is normal.      Breath sounds: Normal breath sounds. No rales.   Abdominal:      General: Bowel sounds are normal. There is no distension.      Palpations: Abdomen is soft. There is no mass.      Tenderness: There is no abdominal tenderness.   Musculoskeletal:         General: Normal range of motion.      Cervical back: Normal range of motion and neck supple.   Skin:     General: Skin is warm and dry.      Findings: No rash.      Comments: +vitiligo   Neurological:      Mental Status: He is alert and oriented to person, place, and time.     MELD-Na score: 10 at 4/17/2023  5:46 PM  MELD score: 10 at 4/17/2023  5:46 PM  Calculated from:  Serum Creatinine: 1.4 mg/dL at 4/17/2023  5:46 PM  Serum Sodium: 139 mmol/L (Using max of 137 mmol/L) at 4/17/2023  5:46 PM  Total Bilirubin: 0.7 mg/dL (Using min of 1 mg/dL) at 4/17/2023  5:46 PM  INR(ratio): 1.0 at 4/17/2023  5:46 PM  Age: 62 years      Lab Results   Component Value Date    GLU 85 04/17/2023    BUN 22 04/17/2023    CREATININE 1.4 04/17/2023    CALCIUM 9.5 04/17/2023     04/17/2023    K 5.0 04/17/2023     04/17/2023    PROT 7.4 04/17/2023    CO2 22 (L) 04/17/2023    CO2 31 (H) 10/17/2016    ANIONGAP 12 04/17/2023    WBC 4.64 04/17/2023    RBC 4.90 04/17/2023    HGB 14.7 04/17/2023    HCT 43.0 04/17/2023    MCV 88 04/17/2023    MCH 30.0 04/17/2023    MCHC 34.2 04/17/2023     Lab Results   Component Value Date    RDW 12.0 04/17/2023     04/17/2023    MPV 9.5 04/17/2023    GRAN 2.7 04/17/2023    GRAN 57.7 04/17/2023    LYMPH 1.5 04/17/2023    LYMPH 33.0 04/17/2023    MONO 0.3 04/17/2023    MONO 6.5  04/17/2023    EOSINOPHIL 1.7 04/17/2023    BASOPHIL 0.9 04/17/2023    EOS 0.1 04/17/2023    BASO 0.04 04/17/2023    CHOL 173 05/19/2021    TRIG 69 05/19/2021    HDL 74 05/19/2021    CHOLHDL 42.8 05/19/2021    TOTALCHOLEST 2.3 05/19/2021    ALBUMIN 4.5 04/17/2023    BILIDIR 0.1 08/22/2018    AST 30 04/17/2023    AST 36 10/17/2016    ALT 37 04/17/2023    ALT 34 10/17/2016    ALKPHOS 46 (L) 04/17/2023    LABPROT 10.6 04/17/2023    INR 1.0 04/17/2023    PSA 1.1 09/17/2020       Assessment and Plan:    Philip Presley is a 62 y.o. male with HCV-induced a hx of HCV and signfiicant fibrosis. My current recommendations:  1. Compensated cirrhosis: MELD <15: remains medically early for liver transplant. Check meld labs every 6 months (due 10/23). Hepatic lesions, multiple, repeat MRI in 08/23 upon his return from travelling; will review current MRI in rad conf  3. HCV SVR: has attained an SVR  4. EGD for variceal screening: given improvement in platelets and no EV on EGD in 2018- no repeat needed    Return 6 months    . . . .

## 2023-04-18 NOTE — TELEPHONE ENCOUNTER
Patient: Philip Presley       MRN: [id number]      : 1960     Age: 62 y.o.  [address] [facility] [address]    Presenting Radiologists:     Providers  Hepatologists: Gem Vasquez MD  Surgeons:   Radiologists:   Advanced Practice providers:     Priority of review: Other indeterminate lesions    Patient Transplant Status: Other no need for tx at present time    Reason for presentation: Indeterminate lesion    Clinical Summary: Philip Presley has previously been treated with harvoni and achieved an SVR12 HCV VL last checked 18 and was still negative; hx of liver biopsy, F3 but had thrombocytopenia (now improved > 200).     HE: none  Ascites: none  EV:  No varices on EGD .     He is feeling well. He denies N/V, abdominal pain or diarrhea. No longer seeing rheumatology due to dx of fibromyalgia. Pt referred back to PCP for management. Likely had covid in Aug 2021.     Last labs 23: ALT 37, AST 30, AFP 2.4  Abdo US 23: Liver: 12.6 cm, normal in size. Nodular contour.  Echogenic liver parenchyma.  There are several geographic hypoechoic lesions measuring up to 2.0 cm in the left hepatic lobe and 1.5 cm in the right hepatic lobe, similar in size and distribution compared to 2022.  No internal Doppler signal.  No new focal lesion.  MRI abdo w wo contrast 23: No MR correlate for the liver lesions described on recent ultrasound; Unchanged 8 mm lesion in the right hepatic lobe, likely a hemangioma; Numerous additional arterial enhancing lesions scattered throughout the liver measuring up to 1.7 cm.  Some satisfy threshold growth criteria, although this may be due to differences in timing of the contrast bolus.  None demonstrate washout or pseudo capsule.  These remain indeterminate for malignancy, and continued follow-up is recommended.     MELD-Na score: 10 at 2023  5:46 PM  MELD score: 10 at 2023  5:46 PM  Calculated from:  Serum Creatinine: 1.4 mg/dL at 2023   5:46 PM  Serum Sodium: 139 mmol/L (Using max of 137 mmol/L) at 4/17/2023  5:46 PM  Total Bilirubin: 0.7 mg/dL (Using min of 1 mg/dL) at 4/17/2023  5:46 PM  INR(ratio): 1.0 at 4/17/2023  5:46 PM  Age: 62 years  Plts have improved to 239 (<150 in 2016)    Imaging to be reviewed: MRI 04/23    HCC Treatment History: n/a    ABO:     Platelets:   Lab Results   Component Value Date/Time     04/17/2023 05:46 PM    EXTPLATELETS 115 03/22/2016 12:00 AM     Creatinine:   Lab Results   Component Value Date/Time    CREATININE 1.4 04/17/2023 05:46 PM    EXTCREATININ 1.21 10/17/2016 12:00 AM     Bilirubin:   Lab Results   Component Value Date/Time    BILITOT 0.7 04/17/2023 05:46 PM    EXTBILITOTAL 0.8 10/17/2016 12:00 AM     AFP Last 3 each if available:   Lab Results   Component Value Date/Time    AFP 2.4 04/17/2023 05:46 PM    AFP 3.9 12/01/2022 09:04 AM    AFP 4.1 05/03/2022 01:52 PM       MELD: MELD-Na score: 10 at 4/17/2023  5:46 PM  MELD score: 10 at 4/17/2023  5:46 PM  Calculated from:  Serum Creatinine: 1.4 mg/dL at 4/17/2023  5:46 PM  Serum Sodium: 139 mmol/L (Using max of 137 mmol/L) at 4/17/2023  5:46 PM  Total Bilirubin: 0.7 mg/dL (Using min of 1 mg/dL) at 4/17/2023  5:46 PM  INR(ratio): 1.0 at 4/17/2023  5:46 PM  Age: 62 years    Plan:     Follow-up Provider:

## 2023-04-18 NOTE — PATIENT INSTRUCTIONS
Labs in 6 months  MRI in Aug 2023  Will review current MRI in our radiology conference next week  Return 6 months

## 2023-04-25 ENCOUNTER — TELEPHONE (OUTPATIENT)
Dept: HEPATOLOGY | Facility: CLINIC | Age: 63
End: 2023-04-25
Payer: MEDICARE

## 2023-04-25 ENCOUNTER — PATIENT MESSAGE (OUTPATIENT)
Dept: HEPATOLOGY | Facility: CLINIC | Age: 63
End: 2023-04-25
Payer: MEDICARE

## 2023-04-25 ENCOUNTER — TELEPHONE (OUTPATIENT)
Dept: TRANSPLANT | Facility: CLINIC | Age: 63
End: 2023-04-25
Payer: MEDICARE

## 2023-04-25 ENCOUNTER — CONFERENCE (OUTPATIENT)
Dept: TRANSPLANT | Facility: CLINIC | Age: 63
End: 2023-04-25
Payer: MEDICARE

## 2023-04-25 NOTE — TELEPHONE ENCOUNTER
Call to pt left message to please call back [phone]    Please call pt- no liver cancer on review of films in radiology today. F/u mri as discussed in clinic visit

## 2023-04-25 NOTE — TELEPHONE ENCOUNTER
Pt returned call and message was given to him that no liver cancer on review of films today in radiology

## 2023-04-25 NOTE — TELEPHONE ENCOUNTER
Message to pt through portal and will have staff call pt: We looked at your films today. No spots meet criteria for liver cancer. Continue surveillance MRI as discussed in the clinic visit.

## 2023-04-27 NOTE — TELEPHONE ENCOUNTER
Patient: Philip Presley       MRN: [id number]      : 1960     Age: 62 y.o.  [address] [facility] [address]     Presenting Radiologists: Tyler Kelsey MD     Providers  Hepatologists: Gem Vasquez MD  Surgeons:   Radiologists:   Advanced Practice providers:      Priority of review: Other indeterminate lesions     Patient Transplant Status: Other no need for tx at present time     Reason for presentation: Indeterminate lesion     Clinical Summary: Philip Presley has previously been treated with harvoni and achieved an SVR12 HCV VL last checked 18 and was still negative; hx of liver biopsy, F3 but had thrombocytopenia (now improved > 200).     HE: none  Ascites: none  EV:  No varices on EGD .     He is feeling well. He denies N/V, abdominal pain or diarrhea. No longer seeing rheumatology due to dx of fibromyalgia. Pt referred back to PCP for management. Likely had covid in Aug 2021.     Last labs 23: ALT 37, AST 30, AFP 2.4  Abdo US 23: Liver: 12.6 cm, normal in size. Nodular contour.  Echogenic liver parenchyma.  There are several geographic hypoechoic lesions measuring up to 2.0 cm in the left hepatic lobe and 1.5 cm in the right hepatic lobe, similar in size and distribution compared to 2022.  No internal Doppler signal.  No new focal lesion.  MRI abdo w wo contrast 23: No MR correlate for the liver lesions described on recent ultrasound; Unchanged 8 mm lesion in the right hepatic lobe, likely a hemangioma; Numerous additional arterial enhancing lesions scattered throughout the liver measuring up to 1.7 cm.  Some satisfy threshold growth criteria, although this may be due to differences in timing of the contrast bolus.  None demonstrate washout or pseudo capsule.  These remain indeterminate for malignancy, and continued follow-up is recommended.     MELD-Na score: 10 at 2023  5:46 PM  MELD score: 10 at 2023  5:46 PM  Calculated from:  Serum Creatinine: 1.4  mg/dL at 4/17/2023  5:46 PM  Serum Sodium: 139 mmol/L (Using max of 137 mmol/L) at 4/17/2023  5:46 PM  Total Bilirubin: 0.7 mg/dL (Using min of 1 mg/dL) at 4/17/2023  5:46 PM  INR(ratio): 1.0 at 4/17/2023  5:46 PM  Age: 62 years  Plts have improved to 239 (<150 in 2016)     Imaging to be reviewed: MRI 04/23     HCC Treatment History: n/a     ABO:      Platelets:         Lab Results   Component Value Date/Time      04/17/2023 05:46 PM     EXTPLATELETS 115 03/22/2016 12:00 AM      Creatinine:         Lab Results   Component Value Date/Time     CREATININE 1.4 04/17/2023 05:46 PM     EXTCREATININ 1.21 10/17/2016 12:00 AM      Bilirubin:         Lab Results   Component Value Date/Time     BILITOT 0.7 04/17/2023 05:46 PM     EXTBILITOTAL 0.8 10/17/2016 12:00 AM      AFP Last 3 each if available:         Lab Results   Component Value Date/Time     AFP 2.4 04/17/2023 05:46 PM     AFP 3.9 12/01/2022 09:04 AM     AFP 4.1 05/03/2022 01:52 PM         MELD: MELD-Na score: 10 at 4/17/2023  5:46 PM  MELD score: 10 at 4/17/2023  5:46 PM  Calculated from:  Serum Creatinine: 1.4 mg/dL at 4/17/2023  5:46 PM  Serum Sodium: 139 mmol/L (Using max of 137 mmol/L) at 4/17/2023  5:46 PM  Total Bilirubin: 0.7 mg/dL (Using min of 1 mg/dL) at 4/17/2023  5:46 PM  INR(ratio): 1.0 at 4/17/2023  5:46 PM  Age: 62 years     Plan: No definite evidence of HCC.  Several enhancing foci throughout the liver without washout, possibly perfusional changes.  Recommend continued surveillance.       DISCUSSION; Look Good.  There are some scattered dots along the periphery of the right hepatic.  Does not have mass like characteristics, there is no wash out.  Stable compared to prior imaging .   None of it looks like HCC.    Patient notified of IR review per Dr Vasquez     Follow-up Provider: Dr Vasquez   No

## 2023-06-27 NOTE — PROGRESS NOTES
Pt identified by name and date of birth, denies any allergies, injection administered as ordered via aseptic technique, tolerated well by pt   [Vaginal ___] : [unfilled] vaginal delivery(s) [Definite ___ (Date)] : the last menstrual period was [unfilled] [Last Pap Smear ___] : date of last pap smear was on [unfilled] [Abnormal Pap Smear] : normal pap smear [Taking Estrogens] : is not taking estrogen replacement [Sexually Active] : is not sexually active [FreeTextEntry1] : largest baby 8 lbs

## 2023-07-17 ENCOUNTER — OFFICE VISIT (OUTPATIENT)
Dept: PRIMARY CARE CLINIC | Facility: CLINIC | Age: 63
End: 2023-07-17
Payer: MEDICARE

## 2023-07-17 ENCOUNTER — PATIENT MESSAGE (OUTPATIENT)
Dept: PRIMARY CARE CLINIC | Facility: CLINIC | Age: 63
End: 2023-07-17

## 2023-07-17 VITALS
HEART RATE: 65 BPM | SYSTOLIC BLOOD PRESSURE: 122 MMHG | OXYGEN SATURATION: 99 % | DIASTOLIC BLOOD PRESSURE: 70 MMHG | HEIGHT: 74 IN | RESPIRATION RATE: 19 BRPM | WEIGHT: 207.44 LBS | BODY MASS INDEX: 26.62 KG/M2

## 2023-07-17 DIAGNOSIS — Z86.19 H/O TRAVELER'S DIARRHEA: ICD-10-CM

## 2023-07-17 DIAGNOSIS — E87.5 HYPERKALEMIA: ICD-10-CM

## 2023-07-17 DIAGNOSIS — M79.7 FIBROMYALGIA: Primary | ICD-10-CM

## 2023-07-17 DIAGNOSIS — K76.6 PORTAL HYPERTENSION: ICD-10-CM

## 2023-07-17 DIAGNOSIS — Z13.6 ENCOUNTER FOR LIPID SCREENING FOR CARDIOVASCULAR DISEASE: ICD-10-CM

## 2023-07-17 DIAGNOSIS — M79.7 FIBROMYALGIA: ICD-10-CM

## 2023-07-17 DIAGNOSIS — G89.29 CHRONIC MIDLINE LOW BACK PAIN WITHOUT SCIATICA: ICD-10-CM

## 2023-07-17 DIAGNOSIS — M54.50 CHRONIC MIDLINE LOW BACK PAIN WITHOUT SCIATICA: ICD-10-CM

## 2023-07-17 DIAGNOSIS — G47.00 INSOMNIA, UNSPECIFIED TYPE: ICD-10-CM

## 2023-07-17 DIAGNOSIS — Z12.5 SCREENING FOR PROSTATE CANCER: ICD-10-CM

## 2023-07-17 DIAGNOSIS — Z13.220 ENCOUNTER FOR LIPID SCREENING FOR CARDIOVASCULAR DISEASE: ICD-10-CM

## 2023-07-17 DIAGNOSIS — Z86.19 HISTORY OF HEPATITIS C: ICD-10-CM

## 2023-07-17 PROCEDURE — 3074F SYST BP LT 130 MM HG: CPT | Mod: CPTII,S$GLB,, | Performed by: INTERNAL MEDICINE

## 2023-07-17 PROCEDURE — 3008F BODY MASS INDEX DOCD: CPT | Mod: CPTII,S$GLB,, | Performed by: INTERNAL MEDICINE

## 2023-07-17 PROCEDURE — 3078F DIAST BP <80 MM HG: CPT | Mod: CPTII,S$GLB,, | Performed by: INTERNAL MEDICINE

## 2023-07-17 PROCEDURE — 1159F MED LIST DOCD IN RCRD: CPT | Mod: CPTII,S$GLB,, | Performed by: INTERNAL MEDICINE

## 2023-07-17 PROCEDURE — 1159F PR MEDICATION LIST DOCUMENTED IN MEDICAL RECORD: ICD-10-PCS | Mod: CPTII,S$GLB,, | Performed by: INTERNAL MEDICINE

## 2023-07-17 PROCEDURE — 99999 PR PBB SHADOW E&M-EST. PATIENT-LVL III: CPT | Mod: PBBFAC,,, | Performed by: INTERNAL MEDICINE

## 2023-07-17 PROCEDURE — 99214 PR OFFICE/OUTPT VISIT, EST, LEVL IV, 30-39 MIN: ICD-10-PCS | Mod: S$GLB,,, | Performed by: INTERNAL MEDICINE

## 2023-07-17 PROCEDURE — 1160F PR REVIEW ALL MEDS BY PRESCRIBER/CLIN PHARMACIST DOCUMENTED: ICD-10-PCS | Mod: CPTII,S$GLB,, | Performed by: INTERNAL MEDICINE

## 2023-07-17 PROCEDURE — 1160F RVW MEDS BY RX/DR IN RCRD: CPT | Mod: CPTII,S$GLB,, | Performed by: INTERNAL MEDICINE

## 2023-07-17 PROCEDURE — 3074F PR MOST RECENT SYSTOLIC BLOOD PRESSURE < 130 MM HG: ICD-10-PCS | Mod: CPTII,S$GLB,, | Performed by: INTERNAL MEDICINE

## 2023-07-17 PROCEDURE — 3008F PR BODY MASS INDEX (BMI) DOCUMENTED: ICD-10-PCS | Mod: CPTII,S$GLB,, | Performed by: INTERNAL MEDICINE

## 2023-07-17 PROCEDURE — 99214 OFFICE O/P EST MOD 30 MIN: CPT | Mod: S$GLB,,, | Performed by: INTERNAL MEDICINE

## 2023-07-17 PROCEDURE — 3078F PR MOST RECENT DIASTOLIC BLOOD PRESSURE < 80 MM HG: ICD-10-PCS | Mod: CPTII,S$GLB,, | Performed by: INTERNAL MEDICINE

## 2023-07-17 PROCEDURE — 99999 PR PBB SHADOW E&M-EST. PATIENT-LVL III: ICD-10-PCS | Mod: PBBFAC,,, | Performed by: INTERNAL MEDICINE

## 2023-07-17 RX ORDER — ZOLPIDEM TARTRATE 12.5 MG/1
12.5 TABLET, FILM COATED, EXTENDED RELEASE ORAL NIGHTLY PRN
Qty: 30 TABLET | Refills: 5 | Status: SHIPPED | OUTPATIENT
Start: 2023-07-17 | End: 2024-01-17 | Stop reason: SDUPTHER

## 2023-07-17 RX ORDER — CHLORZOXAZONE 250 MG/1
250 TABLET ORAL 4 TIMES DAILY PRN
Qty: 270 TABLET | Refills: 3 | Status: SHIPPED | OUTPATIENT
Start: 2023-07-17 | End: 2023-07-18

## 2023-07-17 RX ORDER — TRAMADOL HYDROCHLORIDE 50 MG/1
50 TABLET ORAL EVERY 8 HOURS PRN
Qty: 60 TABLET | Refills: 5 | Status: SHIPPED | OUTPATIENT
Start: 2023-07-17 | End: 2023-09-27 | Stop reason: ALTCHOICE

## 2023-07-17 RX ORDER — CIPROFLOXACIN 500 MG/1
500 TABLET ORAL 2 TIMES DAILY
Qty: 20 TABLET | Refills: 0 | Status: SHIPPED | OUTPATIENT
Start: 2023-07-17 | End: 2023-09-27 | Stop reason: ALTCHOICE

## 2023-07-18 RX ORDER — CHLORZOXAZONE 500 MG/1
500 TABLET ORAL 4 TIMES DAILY PRN
Qty: 270 TABLET | Refills: 1 | Status: SHIPPED | OUTPATIENT
Start: 2023-07-18 | End: 2023-09-27 | Stop reason: ALTCHOICE

## 2023-07-18 NOTE — PROGRESS NOTES
Subjective:       Patient ID: Philip Presley is a 62 y.o. male.    Chief Complaint: Annual Exam and Medication Refill    HPI  patient visit today for routine follow-up he has history of fibromyalgia a chronic lower back pain from lumbar spine disc disease chronic insomnia liver cirrhosis from history of hepatitis-C infection has been follow-up with hepatology routine blood tests so far no signs of liver cancer he has a a liver lesion that he has been follow with MRI he denies short of breath chest pain dyspnea with exertion he denies weight gain weight loss or any edema he plans to travel to [facility] for a 12 day for his [employer] visit discuss traveling disease will get medication for traveler's diarrhea to use p.r.n.  Review of Systems    Objective:      Physical Exam  Vitals and nursing note reviewed.   Constitutional:       General: He is not in acute distress.     Appearance: He is well-developed.   HENT:      Head: Normocephalic and atraumatic.      Right Ear: External ear normal.      Left Ear: External ear normal.      Nose: Nose normal.      Mouth/Throat:      Pharynx: No oropharyngeal exudate.   Eyes:      Extraocular Movements: Extraocular movements intact.      Conjunctiva/sclera: Conjunctivae normal.      Pupils: Pupils are equal, round, and reactive to light.   Neck:      Thyroid: No thyromegaly.   Cardiovascular:      Rate and Rhythm: Normal rate and regular rhythm.      Heart sounds: Normal heart sounds. No murmur heard.    No friction rub. No gallop.   Pulmonary:      Effort: Pulmonary effort is normal. No respiratory distress.      Breath sounds: Normal breath sounds. No wheezing.   Abdominal:      General: Bowel sounds are normal. There is no distension.      Palpations: Abdomen is soft.      Tenderness: There is no abdominal tenderness.   Musculoskeletal:         General: No tenderness or deformity. Normal range of motion.      Cervical back: Normal range of motion and neck supple.    Lymphadenopathy:      Cervical: No cervical adenopathy.   Skin:     General: Skin is warm and dry.      Findings: No erythema or rash.   Neurological:      Mental Status: He is alert and oriented to person, place, and time.   Psychiatric:         Thought Content: Thought content normal.         Judgment: Judgment normal.       Assessment:       1. Fibromyalgia    2. Hyperkalemia    3. Insomnia, unspecified type    4. Chronic midline low back pain without sciatica    5. Screening for prostate cancer    6. Encounter for lipid screening for cardiovascular disease    7. H/O traveler's diarrhea    8. Fibromyalgia    9. Insomnia, unspecified type    10. Portal hypertension    11. History of hepatitis C        Plan:       Fibromyalgia  -     chlorzoxazone (PARAFON FORTE) 250 MG tablet; Take 1 tablet (250 mg total) by mouth 4 (four) times daily as needed for Muscle spasms.  Dispense: 270 tablet; Refill: 3  -     CBC Auto Differential; Future; Expected date: 07/17/2023  -     Comprehensive Metabolic Panel; Future; Expected date: 07/17/2023  -     traMADoL (ULTRAM) 50 mg tablet; Take 1 tablet (50 mg total) by mouth every 8 (eight) hours as needed for Pain.  Dispense: 60 tablet; Refill: 5    Hyperkalemia  -     Comprehensive Metabolic Panel; Future; Expected date: 07/17/2023    Insomnia, unspecified type  -     zolpidem (AMBIEN CR) 12.5 MG CR tablet; Take 1 tablet (12.5 mg total) by mouth nightly as needed for Insomnia.  Dispense: 30 tablet; Refill: 5    Chronic midline low back pain without sciatica  -     traMADoL (ULTRAM) 50 mg tablet; Take 1 tablet (50 mg total) by mouth every 8 (eight) hours as needed for Pain.  Dispense: 60 tablet; Refill: 5    Screening for prostate cancer  -     PSA, Screening; Future; Expected date: 07/17/2023  -     Urinalysis; Future; Expected date: 07/17/2023    Encounter for lipid screening for cardiovascular disease  -     Lipid Panel; Future; Expected date: 07/17/2023    H/O traveler's  diarrhea  -     ciprofloxacin HCl (CIPRO) 500 MG tablet; Take 1 tablet (500 mg total) by mouth 2 (two) times daily.  Dispense: 20 tablet; Refill: 0    Fibromyalgia  Comments:  continue with light physical activity consider PT if pt desires and try avoid chronic opiate use continue withlyrica and muscle relaxant  Orders:  -     chlorzoxazone (PARAFON FORTE) 250 MG tablet; Take 1 tablet (250 mg total) by mouth 4 (four) times daily as needed for Muscle spasms.  Dispense: 270 tablet; Refill: 3  -     CBC Auto Differential; Future; Expected date: 07/17/2023  -     Comprehensive Metabolic Panel; Future; Expected date: 07/17/2023  -     traMADoL (ULTRAM) 50 mg tablet; Take 1 tablet (50 mg total) by mouth every 8 (eight) hours as needed for Pain.  Dispense: 60 tablet; Refill: 5    Insomnia, unspecified type  Comments:  continue with ambien for now discuss with pt long term use currently has no other alternative and pt need sleep to avoid exacerbation of his fibromyalgia  Orders:  -     zolpidem (AMBIEN CR) 12.5 MG CR tablet; Take 1 tablet (12.5 mg total) by mouth nightly as needed for Insomnia.  Dispense: 30 tablet; Refill: 5    Portal hypertension    History of hepatitis C  Comments:  Resolved with treatment        Medication List with Changes/Refills   New Medications    CIPROFLOXACIN HCL (CIPRO) 500 MG TABLET    Take 1 tablet (500 mg total) by mouth 2 (two) times daily.   Current Medications    FLUTICASONE PROPIONATE (FLONASE) 50 MCG/ACTUATION NASAL SPRAY    1 spray (50 mcg total) by Each Nostril route once daily.    LORATADINE (CLARITIN) 10 MG TABLET    Take 1 tablet (10 mg total) by mouth once daily.   Changed and/or Refilled Medications    Modified Medication Previous Medication    CHLORZOXAZONE (PARAFON FORTE) 250 MG TABLET chlorzoxazone (PARAFON FORTE) 250 MG tablet       Take 1 tablet (250 mg total) by mouth 4 (four) times daily as needed for Muscle spasms.    Take 250 mg by mouth 4 (four) times daily as needed for  Muscle spasms.    TRAMADOL (ULTRAM) 50 MG TABLET traMADoL (ULTRAM) 50 mg tablet       Take 1 tablet (50 mg total) by mouth every 8 (eight) hours as needed for Pain.    Take 1 tablet (50 mg total) by mouth every 8 (eight) hours as needed for Pain.    ZOLPIDEM (AMBIEN CR) 12.5 MG CR TABLET zolpidem (AMBIEN CR) 12.5 MG CR tablet       Take 1 tablet (12.5 mg total) by mouth nightly as needed for Insomnia.    Take 1 tablet (12.5 mg total) by mouth nightly as needed for Insomnia.

## 2023-09-18 ENCOUNTER — PATIENT MESSAGE (OUTPATIENT)
Dept: PRIMARY CARE CLINIC | Facility: CLINIC | Age: 63
End: 2023-09-18
Payer: MEDICARE

## 2023-09-27 PROBLEM — M54.50 ACUTE BILATERAL LOW BACK PAIN WITHOUT SCIATICA: Status: ACTIVE | Noted: 2023-09-27

## 2023-09-27 PROBLEM — S81.812A LACERATION OF LEFT LOWER EXTREMITY: Status: ACTIVE | Noted: 2023-09-27

## 2023-10-18 ENCOUNTER — PATIENT MESSAGE (OUTPATIENT)
Dept: CARDIOLOGY | Facility: CLINIC | Age: 63
End: 2023-10-18
Payer: MEDICARE

## 2024-01-17 ENCOUNTER — OFFICE VISIT (OUTPATIENT)
Dept: PRIMARY CARE CLINIC | Facility: CLINIC | Age: 64
End: 2024-01-17
Payer: MEDICARE

## 2024-01-17 VITALS
SYSTOLIC BLOOD PRESSURE: 120 MMHG | WEIGHT: 219.69 LBS | BODY MASS INDEX: 28.19 KG/M2 | HEART RATE: 105 BPM | OXYGEN SATURATION: 97 % | RESPIRATION RATE: 18 BRPM | DIASTOLIC BLOOD PRESSURE: 80 MMHG | HEIGHT: 74 IN

## 2024-01-17 DIAGNOSIS — G47.00 INSOMNIA, UNSPECIFIED TYPE: ICD-10-CM

## 2024-01-17 DIAGNOSIS — G89.29 CHRONIC MIDLINE LOW BACK PAIN WITHOUT SCIATICA: ICD-10-CM

## 2024-01-17 DIAGNOSIS — M79.7 FIBROMYALGIA: ICD-10-CM

## 2024-01-17 DIAGNOSIS — M54.50 CHRONIC MIDLINE LOW BACK PAIN WITHOUT SCIATICA: ICD-10-CM

## 2024-01-17 DIAGNOSIS — K74.60 HEPATIC CIRRHOSIS, UNSPECIFIED HEPATIC CIRRHOSIS TYPE, UNSPECIFIED WHETHER ASCITES PRESENT: ICD-10-CM

## 2024-01-17 DIAGNOSIS — J30.89 NON-SEASONAL ALLERGIC RHINITIS, UNSPECIFIED TRIGGER: ICD-10-CM

## 2024-01-17 DIAGNOSIS — Z86.19 HISTORY OF HEPATITIS C: ICD-10-CM

## 2024-01-17 DIAGNOSIS — L30.9 ECZEMA, UNSPECIFIED TYPE: Primary | ICD-10-CM

## 2024-01-17 DIAGNOSIS — N18.31 CHRONIC KIDNEY DISEASE, STAGE 3A: ICD-10-CM

## 2024-01-17 DIAGNOSIS — K76.6 PORTAL HYPERTENSION: ICD-10-CM

## 2024-01-17 PROCEDURE — 3008F BODY MASS INDEX DOCD: CPT | Mod: CPTII,S$GLB,, | Performed by: INTERNAL MEDICINE

## 2024-01-17 PROCEDURE — 99999 PR PBB SHADOW E&M-EST. PATIENT-LVL IV: CPT | Mod: PBBFAC,,, | Performed by: INTERNAL MEDICINE

## 2024-01-17 PROCEDURE — 99214 OFFICE O/P EST MOD 30 MIN: CPT | Mod: S$GLB,,, | Performed by: INTERNAL MEDICINE

## 2024-01-17 PROCEDURE — 1159F MED LIST DOCD IN RCRD: CPT | Mod: CPTII,S$GLB,, | Performed by: INTERNAL MEDICINE

## 2024-01-17 PROCEDURE — 3074F SYST BP LT 130 MM HG: CPT | Mod: CPTII,S$GLB,, | Performed by: INTERNAL MEDICINE

## 2024-01-17 PROCEDURE — 1160F RVW MEDS BY RX/DR IN RCRD: CPT | Mod: CPTII,S$GLB,, | Performed by: INTERNAL MEDICINE

## 2024-01-17 PROCEDURE — 3079F DIAST BP 80-89 MM HG: CPT | Mod: CPTII,S$GLB,, | Performed by: INTERNAL MEDICINE

## 2024-01-17 RX ORDER — CHLORZOXAZONE 500 MG/1
500 TABLET ORAL 4 TIMES DAILY PRN
COMMUNITY
End: 2024-01-17 | Stop reason: SDUPTHER

## 2024-01-17 RX ORDER — TRAMADOL HYDROCHLORIDE 50 MG/1
50 TABLET ORAL
COMMUNITY
Start: 2024-01-11 | End: 2024-01-17 | Stop reason: SDUPTHER

## 2024-01-17 RX ORDER — CHLORZOXAZONE 500 MG/1
500 TABLET ORAL EVERY 6 HOURS PRN
Qty: 180 TABLET | Refills: 3 | Status: SHIPPED | OUTPATIENT
Start: 2024-01-17 | End: 2024-06-19 | Stop reason: SDUPTHER

## 2024-01-17 RX ORDER — ZOLPIDEM TARTRATE 12.5 MG/1
12.5 TABLET, FILM COATED, EXTENDED RELEASE ORAL NIGHTLY PRN
Qty: 30 TABLET | Refills: 5 | Status: SHIPPED | OUTPATIENT
Start: 2024-01-17 | End: 2024-06-19 | Stop reason: SDUPTHER

## 2024-01-17 RX ORDER — TRAMADOL HYDROCHLORIDE 50 MG/1
50 TABLET ORAL EVERY 8 HOURS PRN
Qty: 60 TABLET | Refills: 5 | Status: SHIPPED | OUTPATIENT
Start: 2024-01-17 | End: 2024-06-19 | Stop reason: SDUPTHER

## 2024-01-18 PROBLEM — N18.31 CHRONIC KIDNEY DISEASE, STAGE 3A: Status: ACTIVE | Noted: 2024-01-18

## 2024-01-19 NOTE — PROGRESS NOTES
Subjective:       Patient ID: Philip Presley is a 63 y.o. male.    Chief Complaint: Follow-up (6 month)      Patient visit today for routine follow-up he has history of fibromyalgia insomnia chronic lower back pain his symptom has been control with medication patient had lumbar spine surgery and lumbar spine injection in the past his symptom now controlled with medication and he can not function and no he jobs patient just traveled to [facility] with his [place] and come back doing well no illnesses he denies short of breath chest pain dyspnea with exertion no weight gain weight loss no change in bowel habit or urination.  Patient also have history of hepatitis-C infection status post treatment repeat hepatitis-C PCR has been negative he has history of liver cirrhosis from hepatitis-C infection with thrombocytopenia and portal hypertension and multiple liver lesion compatible with hemangioma and few intermediate lesion that has not been change on serial MRI he deny abdominal pain nausea vomiting constipation or diarrhea he has been follow with hepatology his thrombocytopenia has been resolved he had EGD done no esophageal varices and abdominal ultrasound no ascites  Review of Systems   Constitutional:  Negative for unexpected weight change.   Respiratory:  Negative for shortness of breath.    Cardiovascular:  Negative for chest pain.   Gastrointestinal:  Negative for abdominal pain.   Musculoskeletal:  Positive for back pain.   Psychiatric/Behavioral:  Positive for dysphoric mood.        Objective:      Physical Exam  Vitals and nursing note reviewed.   Constitutional:       General: He is not in acute distress.     Appearance: He is well-developed.   HENT:      Head: Normocephalic and atraumatic.      Right Ear: External ear normal.      Left Ear: External ear normal.      Nose: Nose normal.   Eyes:      Conjunctiva/sclera: Conjunctivae normal.      Pupils: Pupils are equal, round, and reactive to light.   Neck:       Thyroid: No thyromegaly.   Cardiovascular:      Rate and Rhythm: Normal rate and regular rhythm.      Heart sounds: Normal heart sounds. No murmur heard.     No friction rub. No gallop.   Pulmonary:      Effort: Pulmonary effort is normal. No respiratory distress.      Breath sounds: Normal breath sounds. No wheezing.   Abdominal:      General: Bowel sounds are normal. There is no distension.      Palpations: Abdomen is soft.      Tenderness: There is no abdominal tenderness.   Musculoskeletal:         General: No tenderness or deformity. Normal range of motion.      Cervical back: Normal range of motion and neck supple.      Comments: A subjective diffuse bilateral muscle and joint pain and lower back pain   Lymphadenopathy:      Cervical: No cervical adenopathy.   Skin:     General: Skin is warm and dry.      Capillary Refill: Capillary refill takes less than 2 seconds.      Findings: No erythema or rash.   Neurological:      Mental Status: He is alert and oriented to person, place, and time.   Psychiatric:         Thought Content: Thought content normal.         Judgment: Judgment normal.         Assessment:       1. Eczema, unspecified type    2. Insomnia, unspecified type    3. Non-seasonal allergic rhinitis, unspecified trigger    4. Chronic midline low back pain without sciatica    5. Fibromyalgia    6. History of hepatitis C    7. Hepatic cirrhosis, unspecified hepatic cirrhosis type, unspecified whether ascites present    8. Portal hypertension    9. Chronic kidney disease, stage 3a        Plan:       Eczema, unspecified type  -     Ambulatory referral/consult to Dermatology; Future; Expected date: 01/24/2024    Insomnia, unspecified type  Comments:  continue with ambien for now discuss with pt long term use currently has no other alternative and pt need sleep to avoid exacerbation of his fibromyalgia  Orders:  -     zolpidem (AMBIEN CR) 12.5 MG CR tablet; Take 1 tablet (12.5 mg total) by mouth nightly as  needed for Insomnia.  Dispense: 30 tablet; Refill: 5    Non-seasonal allergic rhinitis, unspecified trigger    Chronic midline low back pain without sciatica  -     traMADoL (ULTRAM) 50 mg tablet; Take 1 tablet (50 mg total) by mouth every 8 (eight) hours as needed for Pain.  Dispense: 60 tablet; Refill: 5  -     chlorzoxazone (PARAFON FORTE) 500 mg Tab; Take 1 tablet (500 mg total) by mouth every 6 (six) hours as needed (muscle spasm).  Dispense: 180 tablet; Refill: 3    Fibromyalgia  -     traMADoL (ULTRAM) 50 mg tablet; Take 1 tablet (50 mg total) by mouth every 8 (eight) hours as needed for Pain.  Dispense: 60 tablet; Refill: 5  -     chlorzoxazone (PARAFON FORTE) 500 mg Tab; Take 1 tablet (500 mg total) by mouth every 6 (six) hours as needed (muscle spasm).  Dispense: 180 tablet; Refill: 3    History of hepatitis C  Comments:  Resolved with treatment    Hepatic cirrhosis, unspecified hepatic cirrhosis type, unspecified whether ascites present  Comments:  Clinically improving no ascites no esophageal varices thrombocytopenia resolved    Portal hypertension  Comments:  Improving no signs of portal hypertension currently    Chronic kidney disease, stage 3a  Comments:  Avoid NSAIDs encourage patient to drink more fluid and rechecked in a few week with other blood tests        Medication List with Changes/Refills   Current Medications    FLUTICASONE PROPIONATE (FLONASE) 50 MCG/ACTUATION NASAL SPRAY    1 spray (50 mcg total) by Each Nostril route once daily.    LORATADINE (CLARITIN) 10 MG TABLET    Take 1 tablet (10 mg total) by mouth once daily.    MUPIROCIN (BACTROBAN) 2 % OINTMENT    Apply topically 3 (three) times daily.   Changed and/or Refilled Medications    Modified Medication Previous Medication    CHLORZOXAZONE (PARAFON FORTE) 500 MG TAB chlorzoxazone (PARAFON FORTE) 500 mg Tab       Take 1 tablet (500 mg total) by mouth every 6 (six) hours as needed (muscle spasm).    Take 500 mg by mouth 4 (four) times  daily as needed.    TRAMADOL (ULTRAM) 50 MG TABLET traMADoL (ULTRAM) 50 mg tablet       Take 1 tablet (50 mg total) by mouth every 8 (eight) hours as needed for Pain.    Take 50 mg by mouth.    ZOLPIDEM (AMBIEN CR) 12.5 MG CR TABLET zolpidem (AMBIEN CR) 12.5 MG CR tablet       Take 1 tablet (12.5 mg total) by mouth nightly as needed for Insomnia.    Take 1 tablet (12.5 mg total) by mouth nightly as needed for Insomnia.

## 2024-01-30 ENCOUNTER — PATIENT MESSAGE (OUTPATIENT)
Dept: DERMATOLOGY | Facility: CLINIC | Age: 64
End: 2024-01-30

## 2024-01-30 ENCOUNTER — OFFICE VISIT (OUTPATIENT)
Dept: DERMATOLOGY | Facility: CLINIC | Age: 64
End: 2024-01-30
Payer: MEDICARE

## 2024-01-30 ENCOUNTER — TELEPHONE (OUTPATIENT)
Dept: DERMATOLOGY | Facility: CLINIC | Age: 64
End: 2024-01-30

## 2024-01-30 DIAGNOSIS — R21 RASH: Primary | ICD-10-CM

## 2024-01-30 DIAGNOSIS — J30.89 NON-SEASONAL ALLERGIC RHINITIS, UNSPECIFIED TRIGGER: ICD-10-CM

## 2024-01-30 DIAGNOSIS — L43.9 LICHEN PLANUS: ICD-10-CM

## 2024-01-30 DIAGNOSIS — L57.0 ACTINIC KERATOSIS: ICD-10-CM

## 2024-01-30 DIAGNOSIS — L43.0 HYPERTROPHIC LICHEN PLANUS: ICD-10-CM

## 2024-01-30 PROCEDURE — 17000 DESTRUCT PREMALG LESION: CPT | Mod: XS,S$GLB,, | Performed by: STUDENT IN AN ORGANIZED HEALTH CARE EDUCATION/TRAINING PROGRAM

## 2024-01-30 PROCEDURE — 17003 DESTRUCT PREMALG LES 2-14: CPT | Mod: S$GLB,,, | Performed by: STUDENT IN AN ORGANIZED HEALTH CARE EDUCATION/TRAINING PROGRAM

## 2024-01-30 PROCEDURE — 11104 PUNCH BX SKIN SINGLE LESION: CPT | Mod: S$GLB,,, | Performed by: STUDENT IN AN ORGANIZED HEALTH CARE EDUCATION/TRAINING PROGRAM

## 2024-01-30 PROCEDURE — 99999 PR PBB SHADOW E&M-EST. PATIENT-LVL III: CPT | Mod: PBBFAC,,, | Performed by: STUDENT IN AN ORGANIZED HEALTH CARE EDUCATION/TRAINING PROGRAM

## 2024-01-30 PROCEDURE — 88305 TISSUE EXAM BY PATHOLOGIST: CPT | Mod: 26,,, | Performed by: PATHOLOGY

## 2024-01-30 PROCEDURE — 99204 OFFICE O/P NEW MOD 45 MIN: CPT | Mod: 25,S$GLB,, | Performed by: STUDENT IN AN ORGANIZED HEALTH CARE EDUCATION/TRAINING PROGRAM

## 2024-01-30 PROCEDURE — 1159F MED LIST DOCD IN RCRD: CPT | Mod: CPTII,S$GLB,, | Performed by: STUDENT IN AN ORGANIZED HEALTH CARE EDUCATION/TRAINING PROGRAM

## 2024-01-30 PROCEDURE — 88305 TISSUE EXAM BY PATHOLOGIST: CPT | Performed by: PATHOLOGY

## 2024-01-30 PROCEDURE — 1160F RVW MEDS BY RX/DR IN RCRD: CPT | Mod: CPTII,S$GLB,, | Performed by: STUDENT IN AN ORGANIZED HEALTH CARE EDUCATION/TRAINING PROGRAM

## 2024-01-30 RX ORDER — TACROLIMUS 1 MG/G
OINTMENT TOPICAL 2 TIMES DAILY
Qty: 60 G | Refills: 4 | Status: SHIPPED | OUTPATIENT
Start: 2024-01-30 | End: 2024-02-28

## 2024-01-30 RX ORDER — BETAMETHASONE DIPROPIONATE 0.5 MG/G
OINTMENT, AUGMENTED TOPICAL 2 TIMES DAILY
Qty: 50 G | Refills: 2 | Status: SHIPPED | OUTPATIENT
Start: 2024-01-30 | End: 2024-02-28

## 2024-01-30 RX ORDER — FLUTICASONE PROPIONATE 50 MCG
SPRAY, SUSPENSION (ML) NASAL
Qty: 32 G | Refills: 3 | Status: SHIPPED | OUTPATIENT
Start: 2024-01-30

## 2024-01-30 NOTE — TELEPHONE ENCOUNTER
No care due was identified.  [facility] Embedded Care Due Messages. Reference number: [id number].   1/30/2024 3:43:03 AM CST

## 2024-01-30 NOTE — PROGRESS NOTES
Subjective:      Patient ID:  Philip Presley is a 63 y.o. male who presents for   Chief Complaint   Patient presents with    Eczema     Pt has tried Bactroban 25 ointment but does not help much.     Eczema - Initial  Affected locations: left lower leg, right lower leg, right arm, left arm and neck  Duration: 2 years  Signs / symptoms: itching    A/w extreme itching  He has tried several topicals    Has been using at home UVC wand which helps    H/o HCV treated in 2007 with cure with no repeat exposure  Has h/o vitiligo    He is only on two medications (ambien and tramadol) which he has been on for many years    Review of Systems   Skin:  Positive for itching, activity-related sunscreen use and wears hat. Negative for daily sunscreen use.   Hematologic/Lymphatic: Bruises/bleeds easily.       Objective:   Physical Exam   Constitutional: He appears well-developed and well-nourished. No distress.   Neurological: He is alert and oriented to person, place, and time. He is not disoriented.   Psychiatric: He has a normal mood and affect.   Skin:   Areas Examined (abnormalities noted in diagram):   Scalp / Hair Palpated and Inspected  Head / Face Inspection Performed  Neck Inspection Performed  Chest / Axilla Inspection Performed  Abdomen Inspection Performed  Genitals / Buttocks / Groin Inspection Performed  Back Inspection Performed  RUE Inspected  LUE Inspection Performed  RLE Inspected  LLE Inspection Performed  Nails and Digits Inspection Performed                 Diagram Legend     Erythematous scaling macule/papule c/w actinic keratosis       Vascular papule c/w angioma      Pigmented verrucoid papule/plaque c/w seborrheic keratosis      Yellow umbilicated papule c/w sebaceous hyperplasia      Irregularly shaped tan macule c/w lentigo     1-2 mm smooth white papules consistent with Milia      Movable subcutaneous cyst with punctum c/w epidermal inclusion cyst      Subcutaneous movable cyst c/w pilar cyst      Firm  pink to brown papule c/w dermatofibroma      Pedunculated fleshy papule(s) c/w skin tag(s)      Evenly pigmented macule c/w junctional nevus     Mildly variegated pigmented, slightly irregular-bordered macule c/w mildly atypical nevus      Flesh colored to evenly pigmented papule c/w intradermal nevus       Pink pearly papule/plaque c/w basal cell carcinoma      Erythematous hyperkeratotic cursted plaque c/w SCC      Surgical scar with no sign of skin cancer recurrence      Open and closed comedones      Inflammatory papules and pustules      Verrucoid papule consistent consistent with wart     Erythematous eczematous patches and plaques     Dystrophic onycholytic nail with subungual debris c/w onychomycosis     Umbilicated papule    Erythematous-base heme-crusted tan verrucoid plaque consistent with inflamed seborrheic keratosis     Erythematous Silvery Scaling Plaque c/w Psoriasis     See annotation            Assessment / Plan:      Pathology Orders:       Normal Orders This Visit    Specimen to Pathology, Dermatology     Questions:    Procedure Type: Dermatology and skin neoplasms    Number of Specimens: 1    ------------------------: -------------------------    Spec 1 Procedure: Biopsy    Spec 1 Clinical Impression: lichen planus    Spec 1 Source: right thigh    Release to patient: Immediate          Rash  -     Ambulatory referral/consult to Dermatology  -     Specimen to Pathology, Dermatology    Punch biopsy procedure note:  Punch biopsy performed after verbal consent obtained. Area marked and prepped with alcohol. Approximately 1cc of 1% lidocaine with epinephrine injected. 3 mm disposable punch used to remove lesion. Hemostasis obtained and biopsy site closed with 1 - 2 Prolene sutures. Wound care instructions reviewed with patient and handout given.      Actinic keratosis  Cryosurgery Procedure Note    Verbal consent from the patient is obtained including, but not limited to, risk of  hypopigmentation/hyperpigmentation, scar, recurrence of lesion. The patient is aware of the precancerous quality and need for treatment of these lesions. Liquid nitrogen cryosurgery is applied to the 3 actinic keratoses, as detailed in the physical exam, to produce a freeze injury. The patient is aware that blisters may form and is instructed on wound care with gentle cleansing and use of vaseline ointment to keep moist until healed. The patient is supplied a handout on cryosurgery and is instructed to call if lesions do not completely resolve.    Lichen planus  Hypertrophic lichen planus  - favor LP on neck, arms, thigh and hypertrophic LP on shins  - ILK to hypertrophic lesions as below  - Topicals to other lesions as below  - He has been using at home UVC unit at home which helps. Also given info for at home UVB wand    -     tacrolimus (PROTOPIC) 0.1 % ointment; Apply topically 2 (two) times daily. Apply to sensitive areas or when taking a break from betamethasone  Dispense: 60 g; Refill: 4  -     augmented betamethasone dipropionate (DIPROLENE-AF) 0.05 % ointment; Apply topically 2 (two) times daily. Use to affected areas for up to 2 weeks then take a 1 week break or decrease to 3 times weekly. Do not apply to groin or face. Use to itchy areas. Strong steroid  Dispense: 50 g; Refill: 2             Should also follow for annual TBSE  Follow up in about 2 weeks (around 2/13/2024) for Suture removal.

## 2024-01-30 NOTE — PATIENT INSTRUCTIONS
[employer] UV 311nm narrowband phototherapy laamp light for Home use    ______________________    Punch Biopsy Wound Care    Your doctor has performed a punch biopsy today.  A band aid and antibiotic ointment has been placed over the site.  This should remain in place for 24 hours.  It is recommended that you keep the area dry for the first 24 hours.  After 24 hours, you may remove the band aid and wash the area with warm soap and water and apply Vaseline jelly.  Many patients prefer to use Neosporin or Bacitracin ointment.  This is acceptable; however know that you can develop an allergy to this medication even if you have used it safely for years.  It is important to keep the area moist.  Letting it dry out and get air slows healing time, will worsen the scar, and make it more difficult to remove the stitches if they were placed.  Band aid is optional after first 24 hours.      If you notice increasing redness, tenderness, pain, or yellow drainage at the biopsy or surgical site, please notify your doctor.  These are signs of an infection.    If your biopsy/surgical site is bleeding, apply firm pressure for 15 minutes straight.  Repeat for another 15 minutes, if it is still bleeding.   If the surgical site continues to bleed, then please contact your doctor.      For [facility] users:   You will receive your biopsy results in [facility] as soon as they are available. Please be assured that your physician/provider will review your results and will then determine what further treatment, evaluation, or planning is required. You should be contacted by your physician's/provider's office within 5 business days of receiving your results; If not, please reach out to directly. This is one more way [facility] is putting you first.       [address] [place] [address]/ [phone] FAX/ [email or web address]          CRYOSURGERY      Your doctor has used a method called cryosurgery to treat your skin  condition. Cryosurgery refers to the use of very cold substances to treat a variety of skin conditions such as warts, pre-skin cancers, molluscum contagiosum, sun spots, and several benign growths. The substance we use in cryosurgery is liquid nitrogen and is so cold (-195 degrees Celsius) that is burns when administered.     Following treatment in the office, the skin may immediately burn and become red. You may find the area around the lesion is affected as well. It is sometimes necessary to treat not only the lesion, but a small area of the surrounding normal skin to achieve a good response.     A blister, and even a blood filled blister, may form after treatment.   This is a normal response. If the blister is painful, it is acceptable to sterilize a needle and with rubbing alcohol and gently pop the blister. It is important that you gently wash the area with soap and warm water as the blister fluid may contain wart virus if a wart was treated. Do no remove the roof of the blister.     The area treated can take anywhere from 1-3 weeks to heal. Healing time depends on the kind skin lesion treated, the location, and how aggressively the lesion was treated. It is recommended that the areas treated are covered with Vaseline or bacitracin ointment and a band-aid. If a band-aid is not practical, just ointment applied several times per day will do. Keeping these areas moist will speed the healing time.    Treatment with liquid nitrogen can leave a scar. In dark skin, it may be a light or dark scar, in light skin it may be a white or pink scar. These will generally fade with time, but may never go away completely.     If you have any concerns after your treatment, please feel free to call the office.       [address] [place] [address]/ [phone] FAX/ [email or web address]

## 2024-01-30 NOTE — TELEPHONE ENCOUNTER
Refill Decision Note   Philip Sakina  is requesting a refill authorization.  Brief Assessment and Rationale for Refill:  Approve     Medication Therapy Plan:        Comments:     Note composed:11:11 AM 01/30/2024

## 2024-02-05 LAB
FINAL PATHOLOGIC DIAGNOSIS: NORMAL
GROSS: NORMAL
Lab: NORMAL
MICROSCOPIC EXAM: NORMAL

## 2024-02-14 ENCOUNTER — CLINICAL SUPPORT (OUTPATIENT)
Dept: DERMATOLOGY | Facility: CLINIC | Age: 64
End: 2024-02-14
Payer: MEDICARE

## 2024-02-14 DIAGNOSIS — Z48.02 VISIT FOR SUTURE REMOVAL: Primary | ICD-10-CM

## 2024-02-14 PROCEDURE — 99024 POSTOP FOLLOW-UP VISIT: CPT | Mod: S$GLB,,, | Performed by: DERMATOLOGY

## 2024-02-14 NOTE — PROGRESS NOTES
CC: 63 y.o.male patient is here for suture removal.     HPI: Patient is s/p punch biopsy/excision of R thigh on 1/30/2024.  Patient reports no problems.    WOUND PE:  Sutures intact.  Wound healing well.  Good approximation of skin edges.  No signs or symptoms of infection.    IMPRESSION:      Skin, right thigh, punch biopsy:  -LICHENOID DERMATITIS, see comment       PLAN:  Sutures removed today.  Continue wound care.    RTC: In 6 month(s) for skin check or sooner should any new concerns arise

## 2024-02-28 ENCOUNTER — OFFICE VISIT (OUTPATIENT)
Dept: DERMATOLOGY | Facility: CLINIC | Age: 64
End: 2024-02-28
Payer: MEDICARE

## 2024-02-28 DIAGNOSIS — L43.9 LICHEN PLANUS: Primary | ICD-10-CM

## 2024-02-28 PROCEDURE — 1160F RVW MEDS BY RX/DR IN RCRD: CPT | Mod: CPTII,S$GLB,, | Performed by: STUDENT IN AN ORGANIZED HEALTH CARE EDUCATION/TRAINING PROGRAM

## 2024-02-28 PROCEDURE — 99213 OFFICE O/P EST LOW 20 MIN: CPT | Mod: S$GLB,,, | Performed by: STUDENT IN AN ORGANIZED HEALTH CARE EDUCATION/TRAINING PROGRAM

## 2024-02-28 PROCEDURE — 99999 PR PBB SHADOW E&M-EST. PATIENT-LVL II: CPT | Mod: PBBFAC,,, | Performed by: STUDENT IN AN ORGANIZED HEALTH CARE EDUCATION/TRAINING PROGRAM

## 2024-02-28 PROCEDURE — 1159F MED LIST DOCD IN RCRD: CPT | Mod: CPTII,S$GLB,, | Performed by: STUDENT IN AN ORGANIZED HEALTH CARE EDUCATION/TRAINING PROGRAM

## 2024-02-28 RX ORDER — TRIAMCINOLONE ACETONIDE 1 MG/G
CREAM TOPICAL 2 TIMES DAILY
Qty: 454 G | Refills: 1 | Status: SHIPPED | OUTPATIENT
Start: 2024-02-28

## 2024-02-28 NOTE — PROGRESS NOTES
Subjective:      Patient ID:  Philip Presley is a 63 y.o. male who presents for   Chief Complaint   Patient presents with    Follow-up     Pt has tried Bactroban 25 ointment but does not help much.     Pt here today for ILK f/u. Pt reports injections helped.     Eczema - Follow-up  Symptom course: stable  Affected locations: left lower leg, right lower leg, right arm, left arm and neck  Duration: 2 years  Signs / symptoms: itching      Seen 1/3024. Suspected LP on arms and legs and hypertrophic lichen planus on legs    Biopsy c/w LP    Tried tacrolimus ointment and diprolene AF--he feels both irritated his skin--within minutes it would get red so he stopped using them  Has used clobetasol in the past which he also did feel helped  Has intolerance to many creams  He feels triamcinolone cream is best for him    S/p ILK x 1 and lesion on legs are much improved--itching has resolved    Initial history:  A/w extreme itching  He has tried several topicals     Has been using at home UVC wand which helps  H/o HCV treated in 2007 with cure with no repeat exposure  Has h/o vitiligo  He is only on two medications (ambien and tramadol) which he has been on for many years    Review of Systems   Skin:  Positive for itching, activity-related sunscreen use and wears hat. Negative for daily sunscreen use.   Hematologic/Lymphatic: Bruises/bleeds easily.       Objective:   Physical Exam   Constitutional: He appears well-developed and well-nourished. No distress.   Neurological: He is alert and oriented to person, place, and time. He is not disoriented.   Psychiatric: He has a normal mood and affect.   Skin:   Areas Examined (abnormalities noted in diagram):   Scalp / Hair Palpated and Inspected  Head / Face Inspection Performed  Neck Inspection Performed  Chest / Axilla Inspection Performed  Abdomen Inspection Performed  Genitals / Buttocks / Groin Inspection Performed  Back Inspection Performed  RUE Inspected  LUE Inspection  Performed  RLE Inspected  LLE Inspection Performed  Nails and Digits Inspection Performed                 Diagram Legend     Erythematous scaling macule/papule c/w actinic keratosis       Vascular papule c/w angioma      Pigmented verrucoid papule/plaque c/w seborrheic keratosis      Yellow umbilicated papule c/w sebaceous hyperplasia      Irregularly shaped tan macule c/w lentigo     1-2 mm smooth white papules consistent with Milia      Movable subcutaneous cyst with punctum c/w epidermal inclusion cyst      Subcutaneous movable cyst c/w pilar cyst      Firm pink to brown papule c/w dermatofibroma      Pedunculated fleshy papule(s) c/w skin tag(s)      Evenly pigmented macule c/w junctional nevus     Mildly variegated pigmented, slightly irregular-bordered macule c/w mildly atypical nevus      Flesh colored to evenly pigmented papule c/w intradermal nevus       Pink pearly papule/plaque c/w basal cell carcinoma      Erythematous hyperkeratotic cursted plaque c/w SCC      Surgical scar with no sign of skin cancer recurrence      Open and closed comedones      Inflammatory papules and pustules      Verrucoid papule consistent consistent with wart     Erythematous eczematous patches and plaques     Dystrophic onycholytic nail with subungual debris c/w onychomycosis     Umbilicated papule    Erythematous-base heme-crusted tan verrucoid plaque consistent with inflamed seborrheic keratosis     Erythematous Silvery Scaling Plaque c/w Psoriasis     See annotation      Assessment / Plan:        Lichen planus--LP on arms and legs--managing with topicals. Hypertrophic LP on shins much improved s/p ILK x 1  - No additional ILK needed  - tried potent TCS and protopic but patient did not tolerate. He feels TAC works best--refill sent. Use PRN  -     triamcinolone acetonide 0.1% (KENALOG) 0.1 % cream; Apply topically 2 (two) times daily. Use to affected areas for up to 2 weeks then take a 1 week break or decrease to 3 times  weekly. Do not apply to groin or face. Use to lichen planus when flares  Dispense: 454 g; Refill: 1             Follow up if symptoms worsen or fail to improve.

## 2024-06-19 ENCOUNTER — OFFICE VISIT (OUTPATIENT)
Dept: PRIMARY CARE CLINIC | Facility: CLINIC | Age: 64
End: 2024-06-19
Payer: MEDICARE

## 2024-06-19 DIAGNOSIS — K74.60 CIRRHOSIS OF LIVER WITHOUT ASCITES, UNSPECIFIED HEPATIC CIRRHOSIS TYPE: ICD-10-CM

## 2024-06-19 DIAGNOSIS — G89.29 CHRONIC MIDLINE LOW BACK PAIN WITHOUT SCIATICA: ICD-10-CM

## 2024-06-19 DIAGNOSIS — K76.9 LIVER LESION: ICD-10-CM

## 2024-06-19 DIAGNOSIS — M54.50 CHRONIC MIDLINE LOW BACK PAIN WITHOUT SCIATICA: ICD-10-CM

## 2024-06-19 DIAGNOSIS — G47.00 INSOMNIA, UNSPECIFIED TYPE: ICD-10-CM

## 2024-06-19 DIAGNOSIS — M79.7 FIBROMYALGIA: ICD-10-CM

## 2024-06-19 DIAGNOSIS — Z13.220 ENCOUNTER FOR LIPID SCREENING FOR CARDIOVASCULAR DISEASE: ICD-10-CM

## 2024-06-19 DIAGNOSIS — N18.31 CHRONIC KIDNEY DISEASE, STAGE 3A: ICD-10-CM

## 2024-06-19 DIAGNOSIS — Z12.5 SCREENING FOR PROSTATE CANCER: ICD-10-CM

## 2024-06-19 DIAGNOSIS — Z13.6 ENCOUNTER FOR LIPID SCREENING FOR CARDIOVASCULAR DISEASE: ICD-10-CM

## 2024-06-19 DIAGNOSIS — Z79.899 ENCOUNTER FOR LONG-TERM CURRENT USE OF MEDICATION: ICD-10-CM

## 2024-06-19 DIAGNOSIS — Z13.1 DIABETES MELLITUS SCREENING: ICD-10-CM

## 2024-06-19 DIAGNOSIS — Z86.19 HISTORY OF HEPATITIS C: Primary | ICD-10-CM

## 2024-06-19 PROCEDURE — 1160F RVW MEDS BY RX/DR IN RCRD: CPT | Mod: CPTII,95,, | Performed by: INTERNAL MEDICINE

## 2024-06-19 PROCEDURE — 99214 OFFICE O/P EST MOD 30 MIN: CPT | Mod: 95,,, | Performed by: INTERNAL MEDICINE

## 2024-06-19 PROCEDURE — 1159F MED LIST DOCD IN RCRD: CPT | Mod: CPTII,95,, | Performed by: INTERNAL MEDICINE

## 2024-06-19 RX ORDER — CHLORZOXAZONE 500 MG/1
500 TABLET ORAL EVERY 6 HOURS PRN
Qty: 180 TABLET | Refills: 3 | Status: SHIPPED | OUTPATIENT
Start: 2024-06-19

## 2024-06-19 RX ORDER — TRAMADOL HYDROCHLORIDE 50 MG/1
50 TABLET ORAL EVERY 8 HOURS PRN
Qty: 60 TABLET | Refills: 5 | Status: SHIPPED | OUTPATIENT
Start: 2024-06-19

## 2024-06-19 RX ORDER — ZOLPIDEM TARTRATE 12.5 MG/1
12.5 TABLET, FILM COATED, EXTENDED RELEASE ORAL NIGHTLY PRN
Qty: 30 TABLET | Refills: 5 | Status: SHIPPED | OUTPATIENT
Start: 2024-06-19

## 2024-06-19 NOTE — Clinical Note
Patient need blood tests as soon as possible and need for of with hepatology for liver cirrhosis and liver lesions

## 2024-06-23 NOTE — PROGRESS NOTES
Subjective:   The patient location is: home  The chief complaint leading to consultation is: f/u and refill medications    Visit type: audiovisual    Face to Face time with patient: 20   minutes of total time spent on the encounter, which includes face to face time and non-face to face time preparing to see the patient (eg, review of tests), Obtaining and/or reviewing separately obtained history, Documenting clinical information in the electronic or other health record, Independently interpreting results (not separately reported) and communicating results to the patient/family/caregiver, or Care coordination (not separately reported).         Each patient to whom he or she provides medical services by telemedicine is:  (1) informed of the relationship between the physician and patient and the respective role of any other health care provider with respect to management of the patient; and (2) notified that he or she may decline to receive medical services by telemedicine and may withdraw from such care at any time.    Notes:     Patient ID: Philip Presley is a 63 y.o. male.    Chief Complaint: No chief complaint on file.    HPI  patient with history of fibromyalgia liver cirrhosis history of hepatitis-C infection resolved with harvoni treatment and history of liver lesion with intermediate for malignancy patient has been follow-up with hepatology last visit more than a year ago supposed to be seen every six-month patient currently out of town to take care of his mom who is sick patient has no new complain except chronic pain from fibromyalgia and chronic insomnia his symptom has been control with medication for years he has not been shown any signs of overuse or abuse.  He denies short of breath chest pain dyspnea with exertion deny abdominal pain weight loss night sweats  Review of Systems    Objective:      Physical Exam  Constitutional:       General: He is not in acute distress.     Appearance: Normal  appearance.   HENT:      Head: Normocephalic and atraumatic.   Eyes:      Extraocular Movements: Extraocular movements intact.   Pulmonary:      Effort: Pulmonary effort is normal.   Abdominal:      Tenderness: There is no abdominal tenderness.   Neurological:      Mental Status: He is alert and oriented to person, place, and time.   Psychiatric:         Mood and Affect: Mood normal.         Thought Content: Thought content normal.         Judgment: Judgment normal.         Assessment:       1. History of hepatitis C    2. Chronic midline low back pain without sciatica    3. Fibromyalgia    4. Insomnia, unspecified type    5. Liver lesion    6. Chronic kidney disease, stage 3a    7. Cirrhosis of liver without ascites, unspecified hepatic cirrhosis type    8. Encounter for lipid screening for cardiovascular disease    9. Screening for prostate cancer    10. Diabetes mellitus screening    11. Encounter for long-term current use of medication        Plan:       History of hepatitis C  -     AFP TUMOR MARKER; Future; Expected date: 06/23/2024    Chronic midline low back pain without sciatica  -     traMADoL (ULTRAM) 50 mg tablet; Take 1 tablet (50 mg total) by mouth every 8 (eight) hours as needed for Pain.  Dispense: 60 tablet; Refill: 5  -     chlorzoxazone (PARAFON FORTE) 500 mg Tab; Take 1 tablet (500 mg total) by mouth every 6 (six) hours as needed (muscle spasm).  Dispense: 180 tablet; Refill: 3    Fibromyalgia  -     traMADoL (ULTRAM) 50 mg tablet; Take 1 tablet (50 mg total) by mouth every 8 (eight) hours as needed for Pain.  Dispense: 60 tablet; Refill: 5  -     chlorzoxazone (PARAFON FORTE) 500 mg Tab; Take 1 tablet (500 mg total) by mouth every 6 (six) hours as needed (muscle spasm).  Dispense: 180 tablet; Refill: 3    Insomnia, unspecified type  Comments:  continue with ambien for now discuss with pt long term use currently has no other alternative and pt need sleep to avoid exacerbation of his  fibromyalgia  Orders:  -     zolpidem (AMBIEN CR) 12.5 MG CR tablet; Take 1 tablet (12.5 mg total) by mouth nightly as needed for Insomnia.  Dispense: 30 tablet; Refill: 5    Liver lesion    Chronic kidney disease, stage 3a  Comments:  Stable avoid all NSAIDs  Orders:  -     Urinalysis; Future; Expected date: 06/23/2024    Cirrhosis of liver without ascites, unspecified hepatic cirrhosis type  Comments:  Patient need appointment follow-up with hepatology  Orders:  -     CBC Auto Differential; Future; Expected date: 06/23/2024  -     Comprehensive Metabolic Panel; Future; Expected date: 06/23/2024  -     AFP TUMOR MARKER; Future; Expected date: 06/23/2024    Encounter for lipid screening for cardiovascular disease  -     Lipid Panel; Future; Expected date: 06/23/2024    Screening for prostate cancer  -     PSA, Screening; Future; Expected date: 06/23/2024    Diabetes mellitus screening  -     Hemoglobin A1C; Future; Expected date: 06/23/2024    Encounter for long-term current use of medication  -     Hemoglobin A1C; Future; Expected date: 06/23/2024        Medication List with Changes/Refills   Current Medications    FLUTICASONE PROPIONATE (FLONASE) 50 MCG/ACTUATION NASAL SPRAY    SHAKE LIQUID AND USE 1 SPRAY(50 MCG) IN EACH NOSTRIL EVERY DAY    LORATADINE (CLARITIN) 10 MG TABLET    Take 1 tablet (10 mg total) by mouth once daily.    MUPIROCIN (BACTROBAN) 2 % OINTMENT    Apply topically 3 (three) times daily.    TRIAMCINOLONE ACETONIDE 0.1% (KENALOG) 0.1 % CREAM    Apply topically 2 (two) times daily. Use to affected areas for up to 2 weeks then take a 1 week break or decrease to 3 times weekly. Do not apply to groin or face. Use to lichen planus when flares   Changed and/or Refilled Medications    Modified Medication Previous Medication    CHLORZOXAZONE (PARAFON FORTE) 500 MG TAB chlorzoxazone (PARAFON FORTE) 500 mg Tab       Take 1 tablet (500 mg total) by mouth every 6 (six) hours as needed (muscle spasm).    Take  1 tablet (500 mg total) by mouth every 6 (six) hours as needed (muscle spasm).    TRAMADOL (ULTRAM) 50 MG TABLET traMADoL (ULTRAM) 50 mg tablet       Take 1 tablet (50 mg total) by mouth every 8 (eight) hours as needed for Pain.    Take 1 tablet (50 mg total) by mouth every 8 (eight) hours as needed for Pain.    ZOLPIDEM (AMBIEN CR) 12.5 MG CR TABLET zolpidem (AMBIEN CR) 12.5 MG CR tablet       Take 1 tablet (12.5 mg total) by mouth nightly as needed for Insomnia.    Take 1 tablet (12.5 mg total) by mouth nightly as needed for Insomnia.

## 2024-06-24 ENCOUNTER — TELEPHONE (OUTPATIENT)
Dept: PRIMARY CARE CLINIC | Facility: CLINIC | Age: 64
End: 2024-06-24
Payer: MEDICARE

## 2024-06-24 NOTE — TELEPHONE ENCOUNTER
----- Message from Remy Wang MD sent at 6/23/2024  6:12 AM CDT -----  Patient need blood tests as soon as possible and need for of with hepatology for liver cirrhosis and liver lesions

## 2024-06-27 ENCOUNTER — TELEPHONE (OUTPATIENT)
Dept: PRIMARY CARE CLINIC | Facility: CLINIC | Age: 64
End: 2024-06-27
Payer: MEDICARE

## 2024-06-27 NOTE — TELEPHONE ENCOUNTER
Called patient and he informed me that he is going to have his labs done tomorrow morning. He has an appt with his hepatologist in October.

## 2024-10-03 ENCOUNTER — TELEPHONE (OUTPATIENT)
Dept: HEPATOLOGY | Facility: CLINIC | Age: 64
End: 2024-10-03
Payer: MEDICARE

## 2024-10-03 DIAGNOSIS — K76.9 LIVER LESION: ICD-10-CM

## 2024-10-03 DIAGNOSIS — K74.60 CIRRHOSIS OF LIVER WITHOUT ASCITES, UNSPECIFIED HEPATIC CIRRHOSIS TYPE: Primary | ICD-10-CM

## 2024-10-03 NOTE — TELEPHONE ENCOUNTER
"----- Message from Med Assistant Ferris sent at 10/3/2024  3:35 PM CDT -----    ----- Message -----  From: Jasiel Mckinney  Sent: 10/3/2024   2:41 PM CDT  To: [facility] Staff    Consult/Advisory    Name Of Caller: Self    Contact Preference?: [phone]      What is the nature of the call?: Returning call to Rayna. Requesting clarification if he can go at anytime to have his labs drawn @ [facility] (before 10/10 appt)      Additional Notes: Requesting to leave a detailed voice message if unable to answer    "Thank you for all that you do for our patients"  "

## 2024-10-03 NOTE — TELEPHONE ENCOUNTER
Returned patient phone call.  No answer.  Left message to call back.  Lab orders place for MELD lab as per Dr Vasquez progress note from 2023.

## 2024-10-03 NOTE — TELEPHONE ENCOUNTER
----- Message from Mickey Ferris sent at 10/3/2024 11:13 AM CDT -----    ----- Message -----  From: Daisy Acuña  Sent: 10/3/2024   9:34 AM CDT  To: [facility] Staff    Type:  Needs Medical Advice/lab    Who Called: pt    Would the patient rather a call back or a response via [facility]? Call   Best Call Back Number: [phone]  Additional Information: pt requesting blood work orders to be submitted before appointment

## 2024-10-03 NOTE — TELEPHONE ENCOUNTER
----- Message from Ayesha sent at 10/3/2024  3:58 PM CDT -----  Regarding: Consult/Advisory  Contact: pt @ [phone]  Consult/Advisory     Name Of Caller: Philip Presley    Contact Preference:[phone]     Nature of call: pt is calling to inform office that labs will be taken in [place] on Monday. Please put time on portal

## 2024-10-10 ENCOUNTER — OFFICE VISIT (OUTPATIENT)
Dept: HEPATOLOGY | Facility: CLINIC | Age: 64
End: 2024-10-10
Payer: MEDICARE

## 2024-10-10 VITALS
OXYGEN SATURATION: 98 % | SYSTOLIC BLOOD PRESSURE: 116 MMHG | DIASTOLIC BLOOD PRESSURE: 77 MMHG | BODY MASS INDEX: 26.88 KG/M2 | WEIGHT: 209.44 LBS | HEIGHT: 74 IN | HEART RATE: 59 BPM

## 2024-10-10 DIAGNOSIS — Z86.19 HISTORY OF HEPATITIS C: ICD-10-CM

## 2024-10-10 DIAGNOSIS — K74.60 CIRRHOSIS OF LIVER WITHOUT ASCITES, UNSPECIFIED HEPATIC CIRRHOSIS TYPE: Primary | ICD-10-CM

## 2024-10-10 PROCEDURE — 99999 PR PBB SHADOW E&M-EST. PATIENT-LVL IV: CPT | Mod: PBBFAC,,, | Performed by: INTERNAL MEDICINE

## 2024-10-10 PROCEDURE — 3288F FALL RISK ASSESSMENT DOCD: CPT | Mod: CPTII,S$GLB,, | Performed by: INTERNAL MEDICINE

## 2024-10-10 PROCEDURE — 3044F HG A1C LEVEL LT 7.0%: CPT | Mod: CPTII,S$GLB,, | Performed by: INTERNAL MEDICINE

## 2024-10-10 PROCEDURE — 1159F MED LIST DOCD IN RCRD: CPT | Mod: CPTII,S$GLB,, | Performed by: INTERNAL MEDICINE

## 2024-10-10 PROCEDURE — 3008F BODY MASS INDEX DOCD: CPT | Mod: CPTII,S$GLB,, | Performed by: INTERNAL MEDICINE

## 2024-10-10 PROCEDURE — 99213 OFFICE O/P EST LOW 20 MIN: CPT | Mod: S$GLB,,, | Performed by: INTERNAL MEDICINE

## 2024-10-10 PROCEDURE — 1160F RVW MEDS BY RX/DR IN RCRD: CPT | Mod: CPTII,S$GLB,, | Performed by: INTERNAL MEDICINE

## 2024-10-10 PROCEDURE — 3074F SYST BP LT 130 MM HG: CPT | Mod: CPTII,S$GLB,, | Performed by: INTERNAL MEDICINE

## 2024-10-10 PROCEDURE — 3078F DIAST BP <80 MM HG: CPT | Mod: CPTII,S$GLB,, | Performed by: INTERNAL MEDICINE

## 2024-10-10 PROCEDURE — 1101F PT FALLS ASSESS-DOCD LE1/YR: CPT | Mod: CPTII,S$GLB,, | Performed by: INTERNAL MEDICINE

## 2024-10-10 NOTE — Clinical Note
Please make sure these have been scheduled: 1. [facility] 2. Labs and megan JOSEPH in 6 months 3. Return 6 months

## 2024-10-10 NOTE — PROGRESS NOTES
HEPATOLOGY FOLLOW UP    Referring Physician: Remy Wang MD    Current Corresponding Physician: Remy Wang MD    Philip Presley is here for follow up of HCV-induced cirrhosis.     HPI   Philip Presley has previously been treated with harvoni and achieved an SVR12 HCV VL last checked 11/20/18 and was still negative; hx of liver biopsy, F3 but had thrombocytopenia (now improved > 200).    HE: none  Ascites: none  EV:  No varices on EGD 08/18.    He is feeling well. He denies N/V, abdominal pain or diarrhea. No longer seeing rheumatology due to dx of fibromyalgia. Pt referred back to PCP for management. Likely had covid in Aug 2021.    Last labs 10/7/24: ALT 30, AST 20, AFP 3.2    MRI abdo w wo contrast 8/21/23: Decreased conspicuity of small hyperenhancing foci in the liver favoring benign etiology such as transient perfusion abnormalities. There is clinical evidence of cirrhosis, findings could also be related to dysplastic or regenerating nodules.     MELD 3.0: 9 at 10/7/2024  8:06 AM  MELD-Na: 9 at 10/7/2024  8:06 AM  Calculated from:  Serum Creatinine: 1.3 mg/dL at 10/7/2024  8:06 AM  Serum Sodium: 139 mmol/L (Using max of 137 mmol/L) at 10/7/2024  8:06 AM  Total Bilirubin: 0.7 mg/dL (Using min of 1 mg/dL) at 10/7/2024  8:06 AM  Serum Albumin: 4.2 g/dL (Using max of 3.5 g/dL) at 10/7/2024  8:06 AM  INR(ratio): 0.9 (Using min of 1) at 10/7/2024  8:06 AM  Age at listing (hypothetical): 64 years  Sex: Male at 10/7/2024  8:06 AM    Plts have improved to 239 (<150 in 2016)    Outpatient Encounter Medications as of 10/10/2024   Medication Sig Dispense Refill    chlorzoxazone (PARAFON FORTE) 500 mg Tab Take 1 tablet (500 mg total) by mouth every 6 (six) hours as needed (muscle spasm). 180 tablet 3    mupirocin (BACTROBAN) 2 % ointment Apply topically 3 (three) times daily. 30 g 0    traMADoL (ULTRAM) 50 mg tablet Take 1 tablet (50 mg total) by mouth every 8 (eight) hours as needed for Pain. 60 tablet 5     triamcinolone acetonide 0.1% (KENALOG) 0.1 % cream Apply topically 2 (two) times daily. Use to affected areas for up to 2 weeks then take a 1 week break or decrease to 3 times weekly. Do not apply to groin or face. Use to lichen planus when flares 454 g 1    zolpidem (AMBIEN CR) 12.5 MG CR tablet Take 1 tablet (12.5 mg total) by mouth nightly as needed for Insomnia. 30 tablet 5    fluticasone propionate (FLONASE) 50 mcg/actuation nasal spray SHAKE LIQUID AND USE 1 SPRAY(50 MCG) IN EACH NOSTRIL EVERY DAY (Patient not taking: Reported on 10/10/2024) 32 g 3    loratadine (CLARITIN) 10 mg tablet Take 1 tablet (10 mg total) by mouth once daily. 90 tablet 3     Facility-Administered Encounter Medications as of 10/10/2024   Medication Dose Route Frequency Provider Last Rate Last Admin    0.9%  NaCl infusion   Intravenous Continuous Wilbur Bee MD   Stopped at 08/28/18 0844    sodium chloride 0.9% flush 3 mL  3 mL Intravenous PRN Wilbur Bee MD         Review of patient's allergies indicates:   Allergen Reactions    Penicillins Hives     Reaction to it when pt was a baby.     Past Medical History:   Diagnosis Date    Cirrhosis     Fibromyalgia     History of hepatitis C 10/28/2015    Treated with Harvoni x 24 wks, SVR 24 10/2016     Vitiligo 11/19/2017       Review of Systems   Constitutional: Negative.    HENT: Negative.     Eyes: Negative.    Respiratory: Negative.     Cardiovascular: Negative.    Gastrointestinal: Negative.    Genitourinary: Negative.    Musculoskeletal: Negative.    Skin: Negative.    Neurological: Negative.    Psychiatric/Behavioral: Negative.       Vitals:    10/10/24 1449   BP: 116/77   Pulse: (!) 59       Physical Exam  Vitals reviewed.   Constitutional:       Appearance: He is well-developed.   HENT:      Head: Normocephalic and atraumatic.   Eyes:      General: No scleral icterus.     Conjunctiva/sclera: Conjunctivae normal.      Pupils: Pupils are equal, round, and reactive to light.   Neck:       Thyroid: No thyromegaly.   Cardiovascular:      Rate and Rhythm: Normal rate and regular rhythm.      Heart sounds: Normal heart sounds.   Pulmonary:      Effort: Pulmonary effort is normal.      Breath sounds: Normal breath sounds. No rales.   Abdominal:      General: Bowel sounds are normal. There is no distension.      Palpations: Abdomen is soft. There is no mass.      Tenderness: There is no abdominal tenderness.   Musculoskeletal:         General: Normal range of motion.      Cervical back: Normal range of motion and neck supple.   Skin:     General: Skin is warm and dry.      Findings: No rash.      Comments: +vitiligo   Neurological:      Mental Status: He is alert and oriented to person, place, and time.       MELD 3.0: 9 at 10/7/2024  8:06 AM  MELD-Na: 9 at 10/7/2024  8:06 AM  Calculated from:  Serum Creatinine: 1.3 mg/dL at 10/7/2024  8:06 AM  Serum Sodium: 139 mmol/L (Using max of 137 mmol/L) at 10/7/2024  8:06 AM  Total Bilirubin: 0.7 mg/dL (Using min of 1 mg/dL) at 10/7/2024  8:06 AM  Serum Albumin: 4.2 g/dL (Using max of 3.5 g/dL) at 10/7/2024  8:06 AM  INR(ratio): 0.9 (Using min of 1) at 10/7/2024  8:06 AM  Age at listing (hypothetical): 64 years  Sex: Male at 10/7/2024  8:06 AM        Lab Results   Component Value Date    GLU 96 10/07/2024    BUN 24 (H) 10/07/2024    CREATININE 1.3 10/07/2024    CALCIUM 9.3 10/07/2024     10/07/2024    K 4.2 10/07/2024     10/07/2024    PROT 7.2 10/07/2024    CO2 20 (L) 10/07/2024    CO2 31 (H) 10/17/2016    ANIONGAP 11 10/07/2024    WBC 4.78 10/07/2024    RBC 5.21 10/07/2024    HGB 15.7 10/07/2024    HCT 46.0 10/07/2024    MCV 88 10/07/2024    MCH 30.1 10/07/2024    MCHC 34.1 10/07/2024     Lab Results   Component Value Date    RDW 12.1 10/07/2024     10/07/2024    MPV 9.3 10/07/2024    GRAN 2.7 10/07/2024    GRAN 57.4 10/07/2024    LYMPH 1.4 10/07/2024    LYMPH 29.7 10/07/2024    MONO 0.5 10/07/2024    MONO 9.4 10/07/2024    EOSINOPHIL 2.5  10/07/2024    BASOPHIL 0.8 10/07/2024    EOS 0.1 10/07/2024    BASO 0.04 10/07/2024    CHOL 176 06/28/2024    TRIG 44 06/28/2024    HDL 72 06/28/2024    CHOLHDL 40.9 06/28/2024    TOTALCHOLEST 2.4 06/28/2024    ALBUMIN 4.2 10/07/2024    BILIDIR 0.1 08/22/2018    AST 20 10/07/2024    AST 36 10/17/2016    ALT 30 10/07/2024    ALT 34 10/17/2016    ALKPHOS 49 (L) 10/07/2024    LABPROT 10.5 10/07/2024    INR 0.9 10/07/2024    PSA 0.59 06/28/2024       Assessment and Plan:  Philip Presley is a 64 y.o. male with HCV-induced a hx of HCV and signfiicant fibrosis. My current recommendations:  1. Compensated cirrhosis: MELD <15: remains medically early for liver transplant. Check meld labs every 6 months (due  04/25).HCC screening: recommend abdo US and every 6 months; labs every 6 months (due in 6 months)  3. HCV SVR: has attained an SVR  4. EGD for variceal screening: given improvement in platelets and no EV on EGD in 2018- no repeat needed    Return 6 months  A total of 35 minutes was spent reviewing the patient's chart, examining the patient, reviewing labs and imaging and coordinating care with the patient's care team.      . . . .

## 2024-10-10 NOTE — PATIENT INSTRUCTIONS
Megan JOSEPH now at [facility]  Labs and megan JOSEPH in 6 months  Return 6 months

## 2024-10-24 ENCOUNTER — TELEPHONE (OUTPATIENT)
Dept: ORTHOPEDICS | Facility: CLINIC | Age: 64
End: 2024-10-24
Payer: MEDICARE

## 2024-10-24 DIAGNOSIS — M25.562 PAIN IN BOTH KNEES, UNSPECIFIED CHRONICITY: Primary | ICD-10-CM

## 2024-10-24 DIAGNOSIS — M25.561 PAIN IN BOTH KNEES, UNSPECIFIED CHRONICITY: Primary | ICD-10-CM

## 2024-10-25 ENCOUNTER — OFFICE VISIT (OUTPATIENT)
Dept: ORTHOPEDICS | Facility: CLINIC | Age: 64
End: 2024-10-25
Payer: MEDICARE

## 2024-10-25 VITALS
HEIGHT: 74 IN | DIASTOLIC BLOOD PRESSURE: 74 MMHG | HEART RATE: 69 BPM | BODY MASS INDEX: 26.88 KG/M2 | WEIGHT: 209.44 LBS | SYSTOLIC BLOOD PRESSURE: 138 MMHG

## 2024-10-25 DIAGNOSIS — M65.312 TRIGGER THUMB OF LEFT HAND: Primary | ICD-10-CM

## 2024-10-25 DIAGNOSIS — M19.041 ARTHRITIS OF RIGHT HAND: ICD-10-CM

## 2024-10-25 DIAGNOSIS — M17.11 PRIMARY OSTEOARTHRITIS OF RIGHT KNEE: ICD-10-CM

## 2024-10-25 PROCEDURE — 99999 PR PBB SHADOW E&M-EST. PATIENT-LVL III: CPT | Mod: PBBFAC,,,

## 2024-10-25 RX ORDER — TRIAMCINOLONE ACETONIDE 40 MG/ML
40 INJECTION, SUSPENSION INTRA-ARTICULAR; INTRAMUSCULAR
Status: COMPLETED | OUTPATIENT
Start: 2024-10-25 | End: 2024-10-25

## 2024-10-25 RX ORDER — DEXAMETHASONE SODIUM PHOSPHATE 4 MG/ML
4 INJECTION, SOLUTION INTRA-ARTICULAR; INTRALESIONAL; INTRAMUSCULAR; INTRAVENOUS; SOFT TISSUE
Status: COMPLETED | OUTPATIENT
Start: 2024-10-25 | End: 2024-10-25

## 2024-10-25 RX ORDER — TRIAMCINOLONE ACETONIDE 40 MG/ML
40 INJECTION, SUSPENSION INTRA-ARTICULAR; INTRAMUSCULAR
Status: DISCONTINUED | OUTPATIENT
Start: 2024-10-25 | End: 2024-10-25 | Stop reason: HOSPADM

## 2024-10-25 RX ADMIN — TRIAMCINOLONE ACETONIDE 40 MG: 40 INJECTION, SUSPENSION INTRA-ARTICULAR; INTRAMUSCULAR at 02:10

## 2024-10-25 RX ADMIN — DEXAMETHASONE SODIUM PHOSPHATE 4 MG: 4 INJECTION, SOLUTION INTRA-ARTICULAR; INTRALESIONAL; INTRAMUSCULAR; INTRAVENOUS; SOFT TISSUE at 03:10

## 2024-10-25 RX ADMIN — TRIAMCINOLONE ACETONIDE 40 MG: 40 INJECTION, SUSPENSION INTRA-ARTICULAR; INTRAMUSCULAR at 03:10

## 2024-10-25 RX ADMIN — DEXAMETHASONE SODIUM PHOSPHATE 4 MG: 4 INJECTION, SOLUTION INTRA-ARTICULAR; INTRALESIONAL; INTRAMUSCULAR; INTRAVENOUS; SOFT TISSUE at 02:10

## 2024-10-25 NOTE — PROGRESS NOTES
Patient ID: Philip Presley is a 64 y.o. male    Swelling and Pain of the Right Knee    History of Present Illness:    Philip Presley presents to clinic for right knee pain. Notes he was on a medication for Hep C years ago and had allergic reaction which caused hair loss, skin pigmentation changes, and joint pain. Denies any knee pain however until recently. 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 States this is when he first noticed his knee pain. Patient denies known WARD. The pain started 2 months ago and is becoming progressively worse.  Pain is located over (points to) medial knee. He reports that the pain is a 0 /10 aching pain today. The pain is affecting ADLs and limiting desired level of activity. Denies numbness, tingling, radiation and inability to bear weight. Pain is 6 /10 at its worst. Does note he walked a mile yesterday which caused some pain.     Also notes left thumb and right MCP index finger pain.  Had bilateral trigger thumb injections done with primary care years ago with improvement.  His left thumb is now bothering him and he was having difficulty bending.  He was also having dorsal right index MCP pain.  Denies known mechanism of injury, states this has been ongoing for 2 months with no relief.  He is requesting injections in both of his hands as he is going out of town for 1 month. RHD.  No numbness or tingling.    Trial of  rest, icing, anti-inflammatories, muscle relaxers and tramadol   with little improvement.     Mechanical symptoms: -  Instability: -    Occupation: desk    Ambulating: unassisted  Diabetic: no  Smoking: past in early 20s  Hx of DVT/PE: no    PAST MEDICAL HISTORY:   Past Medical History:   Diagnosis Date    Cirrhosis     Fibromyalgia     History of hepatitis C 10/28/2015    Treated with Harvoni x 24 wks, SVR 24 10/2016     Vitiligo 11/19/2017     PAST SURGICAL HISTORY:   Past Surgical History:   Procedure Laterality  Date    COLONOSCOPY  2016    ESOPHAGOGASTRODUODENOSCOPY N/A 8/28/2018    Procedure: ESOPHAGOGASTRODUODENOSCOPY (EGD);  Surgeon: Wilbur Bee MD;  Location: [facility] ([address] [place] [address]);  Service: Endoscopy;  Laterality: N/A;  varices screening - Labs - ERW/ prep ins. sent via my [facility] account per Pt request - ERW-   Per CELIA Collazo, OC, Pt moved to 0830, with 0715 labs and 0745 arrival time on 8/28/18, Pt verbalized understanding - ERW    HAND SURGERY      NOSE SURGERY      UPPER GASTROINTESTINAL ENDOSCOPY       FAMILY HISTORY:   Family History   Problem Relation Name Age of Onset    Dementia Mother      COPD Father      Emphysema Father      Cancer Maternal Grandfather colon      SOCIAL HISTORY:   Social History     Occupational History    Not on file   Tobacco Use    Smoking status: Never    Smokeless tobacco: Never   Substance and Sexual Activity    Alcohol use: Yes     Comment: occasional    Drug use: No    Sexual activity: Not Currently        MEDICATIONS:   Current Outpatient Medications:     chlorzoxazone (PARAFON FORTE) 500 mg Tab, Take 1 tablet (500 mg total) by mouth every 6 (six) hours as needed (muscle spasm)., Disp: 180 tablet, Rfl: 3    mupirocin (BACTROBAN) 2 % ointment, Apply topically 3 (three) times daily., Disp: 30 g, Rfl: 0    traMADoL (ULTRAM) 50 mg tablet, Take 1 tablet (50 mg total) by mouth every 8 (eight) hours as needed for Pain., Disp: 60 tablet, Rfl: 5    triamcinolone acetonide 0.1% (KENALOG) 0.1 % cream, Apply topically 2 (two) times daily. Use to affected areas for up to 2 weeks then take a 1 week break or decrease to 3 times weekly. Do not apply to groin or face. Use to lichen planus when flares, Disp: 454 g, Rfl: 1    zolpidem (AMBIEN CR) 12.5 MG CR tablet, Take 1 tablet (12.5 mg total) by mouth nightly as needed for Insomnia., Disp: 30 tablet, Rfl: 5    fluticasone propionate (FLONASE) 50 mcg/actuation nasal spray, SHAKE LIQUID AND USE 1 SPRAY(50 MCG) IN EACH NOSTRIL EVERY DAY (Patient not  taking: Reported on 10/25/2024), Disp: 32 g, Rfl: 3    loratadine (CLARITIN) 10 mg tablet, Take 1 tablet (10 mg total) by mouth once daily., Disp: 90 tablet, Rfl: 3  No current facility-administered medications for this visit.    Facility-Administered Medications Ordered in Other Visits:     0.9%  NaCl infusion, , Intravenous, Continuous, Wilbur Bee MD, Stopped at 08/28/18 0844    sodium chloride 0.9% flush 3 mL, 3 mL, Intravenous, PRN, Wilbur Bee MD  ALLERGIES:   Review of patient's allergies indicates:   Allergen Reactions    Penicillins Hives     Reaction to it when pt was a baby.         Physical Exam     Vitals:    10/25/24 1343   BP: 138/74   Pulse: 69     Alert and oriented to person, place and time. No acute distress. Well-groomed, not ill appearing. Pupils round and reactive, normal respiratory effort, no audible wheezing.     OBSERVATION / INSPECTION   Gait:   Nonantalgic   Alignment:  Neutral   Scars:   None   Muscle atrophy: None  Effusion:  None   Warmth:  None   Discoloration:   None     TENDERNESS                 Patella     Negative    Peripatellar medial    Negative   Peripatellar lateral   Negative   Patellar tendon   Negative   Quad tendon     Negative    Prepatellar Bursa   Negative     Tibial tubercle    Negative    Pes anserine/HS   +      ITB     Negative      LCL     Negative  MFC     Negative  LFC     Negative  MCL      Negative  Medial Joint Line    +   Lateral Joint Line   Negative  Quadriceps    Negative  Hamstring     Negative            Range of  Motion:        Left knee:   0-140°  Right knee:    0-140°      Patellofemoral examination:     Patella position    Centered  Crepitus (PF):    Absent   Lateral tilt:    Normal   J-sign:     None   Patellofemoral grind:   No pain       MENISCAL SIGNS:     Pain on terminal extension:  Negative  Pain on terminal flexion:  Negative  Jahairas maneuver:  Negative     LIGAMENT EXAMINATION:  ACL / Lachman:  normal   PCL-Post.  drawer: normal 0 to  2mm  MCL- Valgus:  normal 0 to 2mm  LCL- Varus:    normal 0 to 2mm    Dial Test: difference c/w other side  At 30° flexion: normal (< 5°)   At 90° flexion: normal (< 5°)       STRENGTH: (* = with pain)   Quadricep   5/5  Hamstrin/5    Bilateral hands: No evidence of wounds, masses or abrasions, there are skin changes present.  Tenderness to palpation A1 pulley of left thumb.  Full active range of motion of hands, no deformity.  There is tenderness to the radial side of the extensor tendon near the MCP joint, full range of motion at joint.    EXTREMITY NEURO-VASCULAR EXAMINATION:   Sensation:  Grossly intact to light touch all dermatomal regions.   Motor Function:  Fully intact motor function at hip, knee, foot and ankle    DTRs;  quadriceps and  achilles 2+.  No clonus and downgoing Babinski.    Vascular status:  DP and PT pulses 2+, brisk capillary refill, symmetric.     Imaging:     Bilateral knee X-rays ordered/reviewed by me showing no evidence of fracture or dislocation. There is no obvious malalignment. No evidence of masses, lesions or foreign bodies.  Mild DJD bilaterally    Bilateral hand x-rays reviewed by me which show no evidence of acute fracture or dislocation, previous right 5th metacarpal fracture, well healed. There is no obvious malalignment. No evidence of masses, lesions or foreign bodies.     Assessment & Plan    Trigger thumb of left hand  -     X-Ray Hand 3 View Bilateral; Future; Expected date: 10/25/2024    Primary osteoarthritis of right knee    Arthritis of right hand         I made the decision to obtain old records of the patient including previous notes and imaging. New imaging was ordered today of the extremity or extremities evaluated. I independently reviewed and interpreted the radiographs and/or MRIs/CT scan today as well as prior imaging.    We discussed at length different treatment options including conservative vs surgical management. These include anti-inflammatories,  acetaminophen, rest, ice, heat, formal physical therapy including strengthening and stretching exercises, home exercise programs, injections, dry needling, and finally surgical intervention.      Patient here for bilateral hand and right knee pain.  Right knee pain onset was after he climbed up and down ladders consistently while working.  Discussed likely arthritic flare-up.  He would like to trial CSI.  Right knee CSI given, post-injection instructions reviewed.    Regarding his hand pain, discussed his left thumb is likely trigger thumb.  He had an injection with good relief in the past and would like to repeat this again.  Left trigger thumb injection given, post-injection instructions reviewed.    For his right index finger, I recommended an MRI as I am unsure what is causing his pain, however patient adamant about injection.  His x-rays are within normal limits.  Has intact extensor tendon mechanism on exam without boutonniere or swan-neck deformity.  Discussed could trial possible MCP injection however unsure this is going to relieve his pain.  Patient elected to proceed despite injection risks.  Right MCP index injection given.  Post-injection instructions reviewed.    Follow up:  If symptoms worsen or fail to improve  X-rays next visit: none    All questions were answered and patient is agreeable to the above plan.

## 2024-10-25 NOTE — PROCEDURES
Small Joint Aspiration/Injection: R index MCP    Date/Time: 10/25/2024 2:00 PM    Performed by: Scarlett Serrano PA-C  Authorized by: Scarlett Serrano PA-C    Consent Done?:  Yes (Verbal)  Indications:  Diagnostic evaluation  Site marked: the procedure site was marked    Timeout: prior to procedure the correct patient, procedure, and site was verified    Prep: patient was prepped and draped in usual sterile fashion      Local anesthetic:  Topical anesthetic and bupivacaine 0.25% without epinephrine  Anesthetic total (ml):  0.5    Location:  Index finger  Site:  R index MCP  Ultrasonic guidance for needle placement?: No    Needle size:  25 G  Approach:  Medial  Medications:  4 mg dexAMETHasone (DECADRON) injection 4 mg/mL  Patient tolerance:  Patient tolerated the procedure well with no immediate complications

## 2024-10-25 NOTE — PROCEDURES
Tendon Sheath    Date/Time: 10/25/2024 2:00 PM    Performed by: Scarlett Serrano PA-C  Authorized by: Scarlett Serrano PA-C    Consent Done?:  Yes (Verbal)  Indications:  Pain  Site marked: the procedure site was marked    Timeout: prior to procedure the correct patient, procedure, and site was verified    Prep: patient was prepped and draped in usual sterile fashion      Local anesthesia used?: Yes    Local anesthetic:  Topical anesthetic and bupivacaine 0.25% without epinephrine  Anesthetic total (ml):  0.5    Location:  Thumb  Site:  L thumb flexor tendon sheath  Ultrasonic guidance for needle placement?: No    Needle size:  25 G  Approach:  Volar  Medications:  4 mg dexAMETHasone (DECADRON) injection 4 mg/mL  Patient tolerance:  Patient tolerated the procedure well with no immediate complications

## 2024-10-25 NOTE — PROCEDURES
Large Joint Aspiration/Injection: R knee    Date/Time: 10/25/2024 2:00 PM    Performed by: Scarlett Serrano PA-C  Authorized by: Scarlett Serrano PA-C    Consent Done?:  Yes (Verbal)  Indications:  Pain and arthritis  Site marked: the procedure site was marked    Timeout: prior to procedure the correct patient, procedure, and site was verified    Prep: patient was prepped and draped in usual sterile fashion      Local anesthesia used?: Yes    Local anesthetic:  Bupivacaine 0.25% without epinephrine and topical anesthetic  Anesthetic total (ml):  4      Details:  Needle Size:  22 G  Ultrasonic Guidance for needle placement?: No    Approach:  Anterolateral  Location:  Knee  Site:  R knee  Medications:  40 mg triamcinolone acetonide 40 mg/mL  Patient tolerance:  Patient tolerated the procedure well with no immediate complications

## 2024-10-28 DIAGNOSIS — K76.9 LIVER LESION: Primary | ICD-10-CM

## 2024-11-06 ENCOUNTER — OFFICE VISIT (OUTPATIENT)
Dept: PRIMARY CARE CLINIC | Facility: CLINIC | Age: 64
End: 2024-11-06
Payer: MEDICARE

## 2024-11-06 VITALS
DIASTOLIC BLOOD PRESSURE: 76 MMHG | HEART RATE: 57 BPM | BODY MASS INDEX: 26.81 KG/M2 | RESPIRATION RATE: 16 BRPM | OXYGEN SATURATION: 98 % | WEIGHT: 208.88 LBS | SYSTOLIC BLOOD PRESSURE: 138 MMHG | HEIGHT: 74 IN

## 2024-11-06 DIAGNOSIS — R35.89 POLYURIA: Primary | ICD-10-CM

## 2024-11-06 DIAGNOSIS — M54.50 CHRONIC MIDLINE LOW BACK PAIN WITHOUT SCIATICA: ICD-10-CM

## 2024-11-06 DIAGNOSIS — Z13.6 ENCOUNTER FOR LIPID SCREENING FOR CARDIOVASCULAR DISEASE: ICD-10-CM

## 2024-11-06 DIAGNOSIS — Z13.220 ENCOUNTER FOR LIPID SCREENING FOR CARDIOVASCULAR DISEASE: ICD-10-CM

## 2024-11-06 DIAGNOSIS — G47.00 INSOMNIA, UNSPECIFIED TYPE: ICD-10-CM

## 2024-11-06 DIAGNOSIS — M79.7 FIBROMYALGIA: ICD-10-CM

## 2024-11-06 DIAGNOSIS — R63.4 WEIGHT LOSS: ICD-10-CM

## 2024-11-06 DIAGNOSIS — R63.1 POLYDIPSIA: ICD-10-CM

## 2024-11-06 DIAGNOSIS — Z79.899 ENCOUNTER FOR LONG-TERM CURRENT USE OF MEDICATION: ICD-10-CM

## 2024-11-06 DIAGNOSIS — G89.29 CHRONIC MIDLINE LOW BACK PAIN WITHOUT SCIATICA: ICD-10-CM

## 2024-11-06 PROCEDURE — 3044F HG A1C LEVEL LT 7.0%: CPT | Mod: CPTII,S$GLB,, | Performed by: INTERNAL MEDICINE

## 2024-11-06 PROCEDURE — 3288F FALL RISK ASSESSMENT DOCD: CPT | Mod: CPTII,S$GLB,, | Performed by: INTERNAL MEDICINE

## 2024-11-06 PROCEDURE — 3008F BODY MASS INDEX DOCD: CPT | Mod: CPTII,S$GLB,, | Performed by: INTERNAL MEDICINE

## 2024-11-06 PROCEDURE — 3075F SYST BP GE 130 - 139MM HG: CPT | Mod: CPTII,S$GLB,, | Performed by: INTERNAL MEDICINE

## 2024-11-06 PROCEDURE — 1101F PT FALLS ASSESS-DOCD LE1/YR: CPT | Mod: CPTII,S$GLB,, | Performed by: INTERNAL MEDICINE

## 2024-11-06 PROCEDURE — 99999 PR PBB SHADOW E&M-EST. PATIENT-LVL III: CPT | Mod: PBBFAC,,, | Performed by: INTERNAL MEDICINE

## 2024-11-06 PROCEDURE — 99214 OFFICE O/P EST MOD 30 MIN: CPT | Mod: S$GLB,,, | Performed by: INTERNAL MEDICINE

## 2024-11-06 PROCEDURE — 3078F DIAST BP <80 MM HG: CPT | Mod: CPTII,S$GLB,, | Performed by: INTERNAL MEDICINE

## 2024-11-06 PROCEDURE — 1159F MED LIST DOCD IN RCRD: CPT | Mod: CPTII,S$GLB,, | Performed by: INTERNAL MEDICINE

## 2024-11-06 PROCEDURE — 1160F RVW MEDS BY RX/DR IN RCRD: CPT | Mod: CPTII,S$GLB,, | Performed by: INTERNAL MEDICINE

## 2024-11-06 RX ORDER — TRAMADOL HYDROCHLORIDE 50 MG/1
50 TABLET ORAL EVERY 8 HOURS PRN
Qty: 60 TABLET | Refills: 5 | Status: SHIPPED | OUTPATIENT
Start: 2024-11-06

## 2024-11-06 RX ORDER — DICLOFENAC SODIUM 30 MG/G
GEL TOPICAL
Qty: 100 G | Refills: 2 | Status: SHIPPED | OUTPATIENT
Start: 2024-11-06

## 2024-11-06 RX ORDER — ZOLPIDEM TARTRATE 12.5 MG/1
12.5 TABLET, FILM COATED, EXTENDED RELEASE ORAL NIGHTLY PRN
Qty: 30 TABLET | Refills: 5 | Status: SHIPPED | OUTPATIENT
Start: 2024-11-06

## 2024-11-06 NOTE — PROGRESS NOTES
Subjective:       Patient ID: Philip Presley is a 64 y.o. male.    Chief Complaint: Annual Exam, Bursitis, and Medication Refill    HPI  History of Present Illness    CHIEF COMPLAINT:  Philip presents with multiple concerns including finger pain, frequent urination with associated weight fluctuations, and to discuss previous abnormal kidney and liver test results.    HPI:  Philip reports finger pain for several months, exacerbated by grasping or using the affected finger. He recently received a knee injection from another physician, who examined his hands but was unable to provide treatment. The physician suggested a diagnosis of tendinitis in the tendon on the dorsal aspect of the finger.    Philip describes 2 episodes of frequent urination in the past 6 months, with the most recent occurring last week. During these episodes, he urinates every 15 to 20 minutes for approximately 2 days, with polydipsia and significant weight loss of 4-5 lbs. His weight returns to baseline after each episode. In the most recent episode, his weight decreased to 200 lbs and has since returned to 208 lbs.    Philip expresses concern about kidney issues, reporting a recent blood test showing kidney function slightly out of range. He questions whether a liver biopsy from 2002, which inadvertently sampled kidney tissue, could have caused damage to the right kidney.    Philip reports having BPH, which occasionally causes difficulty urinating. He self-treats with OTC medication when symptoms are bothersome.    A recent liver ultrasound detected a hypoechogenic area, requiring follow-up with an MRI. A similar situation occurred last year, also necessitating an MRI follow-up.    Philip denies having hypertension or diabetes, and consuming large amounts of sugar or sugary foods.    MEDICATIONS:  Philip is on an OTC medication for enlarged prostate, which he takes as needed.    MEDICAL HISTORY:  Philip has a history of bursitis, tendinitis in  his finger, BPH, kidney issues, and liver issues.    SURGICAL HISTORY:  He underwent a liver biopsy in 2002, which was complicated by an inadvertent kidney biopsy.    TEST RESULTS:  Philip's kidney function test from 2 years ago showed a creatinine level of 1.4. Recently, his creatinine level was 1.3, indicating a slight improvement. His recent PSA level was low.    IMAGING:  Last year, a liver ultrasound detected an abnormality that required a follow-up MRI. A recent liver ultrasound detected a hypoechogenic area, also requiring a follow-up MRI. Philip had a liver MRI last year.      ROS:  General: -fever, -chills, -fatigue, -weight gain, +weight loss  Eyes: -vision changes, -redness, -discharge  ENT: -ear pain, -nasal congestion, -sore throat  Cardiovascular: -chest pain, -palpitations, -lower extremity edema  Respiratory: -cough, -shortness of breath  Gastrointestinal: -abdominal pain, -nausea, -vomiting, -diarrhea, -constipation, -blood in stool  Genitourinary: -dysuria, -hematuria, -frequency, +difficulty urinating  Musculoskeletal: +joint pain, -muscle pain  Skin: -rash, -lesion  Neurological: -headache, -dizziness, -numbness, -tingling  Psychiatric: -anxiety, -depression, -sleep difficulty  Endocrine: +excessive urination       Review of Systems   Constitutional:  Negative for activity change.   Eyes:  Negative for discharge.   Endocrine: Negative for polydipsia and polyuria.       Objective:      Physical Exam  Physical Exam    General: No acute distress. Well-developed. Well-nourished.  Eyes: EOMI. Sclerae anicteric.  HENT: Normocephalic. Atraumatic. Nares patent. Moist oral mucosa.  Ears: Bilateral TMs clear. Bilateral EACs clear.  Cardiovascular: Regular rate. Regular rhythm. No murmurs. No rubs. No gallops. Normal S1, S2.  Respiratory: Normal respiratory effort. Clear to auscultation bilaterally. No rales. No rhonchi. No wheezing.  Abdomen: Soft. Non-tender. Non-distended. Normoactive bowel  sounds.  Musculoskeletal: No  obvious deformity.  Extremities: No lower extremity edema.  Neurological: Alert & oriented x3. No slurred speech. Normal gait.  Psychiatric: Normal mood. Normal affect. Good insight. Good judgment.  Skin: Warm. Dry. No rash.           Assessment:       1. Polyuria    2. Chronic midline low back pain without sciatica    3. Fibromyalgia    4. Insomnia, unspecified type    5. Polydipsia    6. Weight loss    7. Encounter for lipid screening for cardiovascular disease    8. Encounter for long-term current use of medication        Plan:       Polyuria  -     Hemoglobin A1C; Future; Expected date: 11/06/2024  -     Urinalysis; Future; Expected date: 11/06/2024    Chronic midline low back pain without sciatica  -     traMADoL (ULTRAM) 50 mg tablet; Take 1 tablet (50 mg total) by mouth every 8 (eight) hours as needed for Pain.  Dispense: 60 tablet; Refill: 5  -     Urinalysis; Future; Expected date: 11/06/2024    Fibromyalgia  -     traMADoL (ULTRAM) 50 mg tablet; Take 1 tablet (50 mg total) by mouth every 8 (eight) hours as needed for Pain.  Dispense: 60 tablet; Refill: 5  -     CBC Auto Differential; Future; Expected date: 11/06/2024  -     Comprehensive Metabolic Panel; Future; Expected date: 11/06/2024  -     TSH; Future; Expected date: 11/06/2024  -     T4, Free; Future; Expected date: 11/06/2024    Insomnia, unspecified type  Comments:  continue with ambien for now discuss with pt long term use currently has no other alternative and pt need sleep to avoid exacerbation of his fibromyalgia  Orders:  -     zolpidem (AMBIEN CR) 12.5 MG CR tablet; Take 1 tablet (12.5 mg total) by mouth nightly as needed for Insomnia.  Dispense: 30 tablet; Refill: 5    Polydipsia  -     Hemoglobin A1C; Future; Expected date: 11/06/2024  -     Urinalysis; Future; Expected date: 11/06/2024    Weight loss  -     TSH; Future; Expected date: 11/06/2024  -     T4, Free; Future; Expected date: 11/06/2024    Encounter  for lipid screening for cardiovascular disease  -     Lipid Panel; Future; Expected date: 11/06/2024    Encounter for long-term current use of medication  -     Hemoglobin A1C; Future; Expected date: 11/06/2024    Other orders  -     diclofenac sodium (SOLARAZE) 3 % gel; Apply  MP joint finger TID  Dispense: 100 g; Refill: 2      Assessment & Plan    Considered potential causes for reported episodes of frequent urination, weight fluctuation, and thirst, including diabetes and hormonal imbalance (anti-diuretic hormone)  Assessed kidney function based on previous test results, noting stability over past 2 years  Evaluated prostate health, considering BPH as possible contributor to urinary symptoms  Reviewed recent liver ultrasound findings, noting hypoechogenic area requiring follow-up MRI  Considered potential impact of recent steroid injection on blood sugar    DIABETES MELLITUS:  - Explained potential link between elevated blood sugar and frequent urination, increased thirst.  - Ordered blood test and urine test to investigate frequent urination episodes and weight fluctuation.    DIABETES INSIPIDUS:  - Discussed anti-diuretic hormone's role in regulating water retention and urination.    BENIGN PROSTATIC HYPERPLASIA (BPH):  - Explained BPH and its effects on urination.    LIVER ABNORMALITY:  - Discussed significance of hypoechogenic finding on liver ultrasound and need for further imaging.  - Follow up after Thanksgiving for liver ultrasound and potential MRI.    KIDNEY HEALTH:  - Philip to avoid anti-inflammatory medications like ibuprofen due to potential kidney impact.    TRAVELER'S DIARRHEA PREVENTION:  - Refilled medication for traveler's diarrhea prevention for upcoming trip to [facility].    FOLLOW UP:  - Contact the office if urinary symptoms recur or worsen.           Medication List with Changes/Refills   New Medications    DICLOFENAC SODIUM (SOLARAZE) 3 % GEL    Apply  MP joint finger TID   Current  Medications    CHLORZOXAZONE (PARAFON FORTE) 500 MG TAB    Take 1 tablet (500 mg total) by mouth every 6 (six) hours as needed (muscle spasm).    MUPIROCIN (BACTROBAN) 2 % OINTMENT    Apply topically 3 (three) times daily.    TRIAMCINOLONE ACETONIDE 0.1% (KENALOG) 0.1 % CREAM    Apply topically 2 (two) times daily. Use to affected areas for up to 2 weeks then take a 1 week break or decrease to 3 times weekly. Do not apply to groin or face. Use to lichen planus when flares   Changed and/or Refilled Medications    Modified Medication Previous Medication    TRAMADOL (ULTRAM) 50 MG TABLET traMADoL (ULTRAM) 50 mg tablet       Take 1 tablet (50 mg total) by mouth every 8 (eight) hours as needed for Pain.    Take 1 tablet (50 mg total) by mouth every 8 (eight) hours as needed for Pain.    ZOLPIDEM (AMBIEN CR) 12.5 MG CR TABLET zolpidem (AMBIEN CR) 12.5 MG CR tablet       Take 1 tablet (12.5 mg total) by mouth nightly as needed for Insomnia.    Take 1 tablet (12.5 mg total) by mouth nightly as needed for Insomnia.   Discontinued Medications    FLUTICASONE PROPIONATE (FLONASE) 50 MCG/ACTUATION NASAL SPRAY    SHAKE LIQUID AND USE 1 SPRAY(50 MCG) IN EACH NOSTRIL EVERY DAY    LORATADINE (CLARITIN) 10 MG TABLET    Take 1 tablet (10 mg total) by mouth once daily.        This note was generated with the assistance of ambient listening technology. Verbal consent was obtained by the patient and accompanying visitor(s) for the recording of patient appointment to facilitate this note. I attest to having reviewed and edited the generated note for accuracy, though some syntax or spelling errors may persist. Please contact the author of this note for any clarification.

## 2024-12-02 ENCOUNTER — PATIENT MESSAGE (OUTPATIENT)
Dept: HEPATOLOGY | Facility: CLINIC | Age: 64
End: 2024-12-02
Payer: MEDICARE

## 2024-12-20 DIAGNOSIS — G47.00 INSOMNIA, UNSPECIFIED TYPE: ICD-10-CM

## 2024-12-20 RX ORDER — ZOLPIDEM TARTRATE 12.5 MG/1
12.5 TABLET, FILM COATED, EXTENDED RELEASE ORAL NIGHTLY PRN
Qty: 30 TABLET | Refills: 5 | OUTPATIENT
Start: 2024-12-20

## 2024-12-20 NOTE — TELEPHONE ENCOUNTER
No care due was identified.  [facility] Embedded Care Due Messages. Reference number: [id number].   12/20/2024 2:08:38 PM CST

## 2025-02-10 DIAGNOSIS — L43.9 LICHEN PLANUS: ICD-10-CM

## 2025-02-10 RX ORDER — TRIAMCINOLONE ACETONIDE 1 MG/G
CREAM TOPICAL
Qty: 454 G | Refills: 1 | Status: SHIPPED | OUTPATIENT
Start: 2025-02-10

## 2025-04-05 ENCOUNTER — RESULTS FOLLOW-UP (OUTPATIENT)
Dept: TRANSPLANT | Facility: CLINIC | Age: 65
End: 2025-04-05

## 2025-05-06 ENCOUNTER — OFFICE VISIT (OUTPATIENT)
Dept: PRIMARY CARE CLINIC | Facility: CLINIC | Age: 65
End: 2025-05-06
Payer: MEDICARE

## 2025-05-06 ENCOUNTER — TELEPHONE (OUTPATIENT)
Dept: PRIMARY CARE CLINIC | Facility: CLINIC | Age: 65
End: 2025-05-06

## 2025-05-06 VITALS
HEIGHT: 74 IN | WEIGHT: 214.5 LBS | SYSTOLIC BLOOD PRESSURE: 110 MMHG | OXYGEN SATURATION: 98 % | HEART RATE: 65 BPM | BODY MASS INDEX: 27.53 KG/M2 | RESPIRATION RATE: 16 BRPM | DIASTOLIC BLOOD PRESSURE: 80 MMHG

## 2025-05-06 DIAGNOSIS — N18.31 CHRONIC KIDNEY DISEASE, STAGE 3A: ICD-10-CM

## 2025-05-06 DIAGNOSIS — M79.7 FIBROMYALGIA: Primary | ICD-10-CM

## 2025-05-06 DIAGNOSIS — G89.29 CHRONIC MIDLINE LOW BACK PAIN WITHOUT SCIATICA: ICD-10-CM

## 2025-05-06 DIAGNOSIS — Z13.6 ENCOUNTER FOR LIPID SCREENING FOR CARDIOVASCULAR DISEASE: ICD-10-CM

## 2025-05-06 DIAGNOSIS — M54.50 CHRONIC MIDLINE LOW BACK PAIN WITHOUT SCIATICA: ICD-10-CM

## 2025-05-06 DIAGNOSIS — M79.7 FIBROMYALGIA: ICD-10-CM

## 2025-05-06 DIAGNOSIS — Z12.11 ENCOUNTER FOR SCREENING COLONOSCOPY: Primary | ICD-10-CM

## 2025-05-06 DIAGNOSIS — G47.00 INSOMNIA, UNSPECIFIED TYPE: ICD-10-CM

## 2025-05-06 DIAGNOSIS — Z13.220 ENCOUNTER FOR LIPID SCREENING FOR CARDIOVASCULAR DISEASE: ICD-10-CM

## 2025-05-06 DIAGNOSIS — K76.6 PORTAL HYPERTENSION: ICD-10-CM

## 2025-05-06 DIAGNOSIS — Z12.5 SCREENING FOR PROSTATE CANCER: ICD-10-CM

## 2025-05-06 PROCEDURE — 3008F BODY MASS INDEX DOCD: CPT | Mod: CPTII,S$GLB,, | Performed by: INTERNAL MEDICINE

## 2025-05-06 PROCEDURE — 1126F AMNT PAIN NOTED NONE PRSNT: CPT | Mod: CPTII,S$GLB,, | Performed by: INTERNAL MEDICINE

## 2025-05-06 PROCEDURE — 1101F PT FALLS ASSESS-DOCD LE1/YR: CPT | Mod: CPTII,S$GLB,, | Performed by: INTERNAL MEDICINE

## 2025-05-06 PROCEDURE — 1160F RVW MEDS BY RX/DR IN RCRD: CPT | Mod: CPTII,S$GLB,, | Performed by: INTERNAL MEDICINE

## 2025-05-06 PROCEDURE — 3074F SYST BP LT 130 MM HG: CPT | Mod: CPTII,S$GLB,, | Performed by: INTERNAL MEDICINE

## 2025-05-06 PROCEDURE — 3288F FALL RISK ASSESSMENT DOCD: CPT | Mod: CPTII,S$GLB,, | Performed by: INTERNAL MEDICINE

## 2025-05-06 PROCEDURE — 1159F MED LIST DOCD IN RCRD: CPT | Mod: CPTII,S$GLB,, | Performed by: INTERNAL MEDICINE

## 2025-05-06 PROCEDURE — 99214 OFFICE O/P EST MOD 30 MIN: CPT | Mod: S$GLB,,, | Performed by: INTERNAL MEDICINE

## 2025-05-06 PROCEDURE — 3079F DIAST BP 80-89 MM HG: CPT | Mod: CPTII,S$GLB,, | Performed by: INTERNAL MEDICINE

## 2025-05-06 PROCEDURE — 99999 PR PBB SHADOW E&M-EST. PATIENT-LVL III: CPT | Mod: PBBFAC,,, | Performed by: INTERNAL MEDICINE

## 2025-05-06 RX ORDER — ZOLPIDEM TARTRATE 12.5 MG/1
12.5 TABLET, FILM COATED, EXTENDED RELEASE ORAL NIGHTLY PRN
Qty: 30 TABLET | Refills: 5 | Status: SHIPPED | OUTPATIENT
Start: 2025-05-06

## 2025-05-06 RX ORDER — TRAMADOL HYDROCHLORIDE 50 MG/1
50 TABLET ORAL EVERY 8 HOURS PRN
Qty: 60 TABLET | Refills: 5 | Status: SHIPPED | OUTPATIENT
Start: 2025-05-06

## 2025-05-06 RX ORDER — CHLORZOXAZONE 500 MG/1
500 TABLET ORAL EVERY 6 HOURS PRN
Qty: 180 TABLET | Refills: 3 | Status: SHIPPED | OUTPATIENT
Start: 2025-05-06 | End: 2025-05-07

## 2025-05-06 NOTE — Clinical Note
Pt states he had colonoscopy at [facility] recently but I can't find any report or docimentation

## 2025-05-07 RX ORDER — METHOCARBAMOL 750 MG/1
750 TABLET, FILM COATED ORAL 2 TIMES DAILY PRN
Qty: 60 TABLET | Refills: 0 | Status: SHIPPED | OUTPATIENT
Start: 2025-05-07 | End: 2025-05-08 | Stop reason: SDUPTHER

## 2025-05-07 NOTE — PROGRESS NOTES
Subjective:       Patient ID: Philip Presley is a 64 y.o. male.    Chief Complaint: Follow-up (6 month) and Medication Refill    HPI  History of Present Illness    CHIEF COMPLAINT:  Philip presents today for follow-up.    GENITOURINARY:  He recently changed his prostate medication which initially resulted in frequent urination every 1.5 hours nightly. After trying an alternative formulation from a different retailer that caused adverse reactions, he returned to his previous medication which reduced nighttime urination to once per night.    ALLERGIES AND ENT:  He reports ongoing hay fever symptoms and sinus problems. He has previously prescribed medication for these conditions.    PAIN MANAGEMENT:  He keeps Naloxone and pain medication at both his residence and truck, using them only as needed for pain symptoms.H/O LS disc ds with radiculopathy treated by ortho NS in past has SUMIT and fibromyalgia have tried different meds     DIAGNOSTIC HISTORY:  His most recent PSA test from June last year showed improved results with lower levels than previous. His last colonoscopy and upper endoscopy were performed simultaneously at [facility], though the exact date is unclear. He has an upcoming liver check scheduled for October.with hepatology for h/o Hep C infection with liver cirrhosis  He has chronic insomnia for yrs controlled with same medication  LABS:  Blood tests from a couple months ago showed cholesterol levels within normal range.      ROS:  General: -fever, -chills, -fatigue, -weight gain, -weight loss  Eyes: -vision changes, -redness, -discharge  ENT: -ear pain, -nasal congestion, -sore throat, +sinus pressure  Cardiovascular: -chest pain, -palpitations, -lower extremity edema  Respiratory: -cough, -shortness of breath  Gastrointestinal: -abdominal pain, -nausea, -vomiting, -diarrhea, -constipation, -blood in stool  Genitourinary: -dysuria, -hematuria, -frequency, +excessive urination, +nocturia  Musculoskeletal:  -joint pain, -muscle pain  Skin: -rash, -lesion  Neurological: -headache, -dizziness, -numbness, -tingling  Psychiatric: -anxiety, -depression, -sleep difficulty       Review of Systems    Objective:      Physical Exam  Physical Exam    General: No acute distress. Well-developed. Well-nourished.  Eyes: EOMI. Sclerae anicteric.  HENT: Normocephalic. Atraumatic. Nares patent. Moist oral mucosa.  Ears: Bilateral TMs clear. Bilateral EACs clear.  Cardiovascular: Regular rate. Regular rhythm. No murmurs. No rubs. No gallops. Normal S1, S2.  Respiratory: Normal respiratory effort. Clear to auscultation bilaterally. No rales. No rhonchi. No wheezing.  Abdomen: Soft. Non-tender. Non-distended. Normoactive bowel sounds.  Musculoskeletal: No  obvious deformity.  Extremities: No lower extremity edema.  Neurological: Alert & oriented x3. No slurred speech. Normal gait.  Psychiatric: Normal mood. Normal affect. Good insight. Good judgment.  Skin: Warm. Dry. No rash.           Assessment:       1. Encounter for screening colonoscopy    2. Insomnia, unspecified type    3. Chronic midline low back pain without sciatica    4. Fibromyalgia    5. Portal hypertension    6. Chronic kidney disease, stage 3a    7. Encounter for lipid screening for cardiovascular disease    8. Screening for prostate cancer        Plan:       Encounter for screening colonoscopy  Comments:  pt remember very sure he had colonoscopy not too long ago at [facility] but not available in chart will have Toya Victor to check    Insomnia, unspecified type  Comments:  continue with ambien for now discuss with pt long term use currently has no other alternative and pt need sleep to avoid exacerbation of his fibromyalgia  Orders:  -     zolpidem (AMBIEN CR) 12.5 MG CR tablet; Take 1 tablet (12.5 mg total) by mouth nightly as needed for Insomnia.  Dispense: 30 tablet; Refill: 5    Chronic midline low back pain without sciatica  -     chlorzoxazone (PARAFON FORTE) 500 mg  Tab; Take 1 tablet (500 mg total) by mouth every 6 (six) hours as needed (muscle spasm).  Dispense: 180 tablet; Refill: 3  -     traMADoL (ULTRAM) 50 mg tablet; Take 1 tablet (50 mg total) by mouth every 8 (eight) hours as needed for Pain.  Dispense: 60 tablet; Refill: 5    Fibromyalgia  Comments:  continue with treatment no change  Orders:  -     chlorzoxazone (PARAFON FORTE) 500 mg Tab; Take 1 tablet (500 mg total) by mouth every 6 (six) hours as needed (muscle spasm).  Dispense: 180 tablet; Refill: 3  -     traMADoL (ULTRAM) 50 mg tablet; Take 1 tablet (50 mg total) by mouth every 8 (eight) hours as needed for Pain.  Dispense: 60 tablet; Refill: 5    Portal hypertension  Comments:  from liver cirrhosis stable sine HepC got treated consider beta one blocker    Chronic kidney disease, stage 3a  Comments:  kidney function has been improve with last 2 labs continue avoid nephrotoxic meds cntrast  Orders:  -     CBC Auto Differential; Future; Expected date: 08/06/2025  -     Comprehensive Metabolic Panel; Future; Expected date: 08/06/2025    Encounter for lipid screening for cardiovascular disease  -     Lipid Panel; Future; Expected date: 08/06/2025    Screening for prostate cancer  -     PSA, Screening; Future; Expected date: 08/06/2025  -     Urinalysis; Future; Expected date: 08/06/2025      Assessment & Plan    K76.6 Portal hypertension  N18.31 Chronic kidney disease, stage 3a  B18.2 Chronic viral hepatitis C  N40.1 Benign prostatic hyperplasia with lower urinary tract symptoms  G89.29 Other chronic pain  J30.1 Allergic rhinitis due to pollen  Z79.891 Long term (current) use of opiate analgesic    IMPRESSION:  - Reviewed liver function and hepatitis C status, noting current treatments are effective in controlling the virus.  - Assessed renal function, noting filtration rate remains normal (>60 cc/min).  - Evaluated prostate health, noting recent PSA results were lower than previous tests.  - Considered need for  colonoscopy based on previous procedure recommendations.    PORTAL HYPERTENSION:  - Discussed liver transplant options with the patient.  - WBC looks good, indicating no abnormal findings related to liver function.  - Evaluated treatment options for liver conditions.  - Scheduled liver function testing during next visit in October.    CHRONIC KIDNEY DISEASE, STAGE 3A:  - Monitored the patient's filtration rate, which remains high and normal, indicating good kidney function without disease progression.  - Discussed kidney health monitoring with the patient.  - Will check kidney function along with liver and cholesterol during next visit in October.    CHRONIC HEPATITIS C:  - Explained current hepatitis C treatments, including the ability to use transplanted livers from hepatitis C-positive donors with subsequent viral treatment.  - Discussed advancements highlighting how this previously difficult-to-treat virus can now be effectively eliminated.  - Ordered comprehensive blood panel including liver function tests.    BENIGN PROSTATIC HYPERPLASIA WITH LOWER URINARY TRACT SYMPTOMS:  - Ordered PSA test.  - Philip reports frequent urination every 1.5 hours at night but found a medication that reduced frequency to once nightly.  - Evaluated the impact of different supplements on urination frequency and advised patient to continue using the previous supplement that provided better control of urinary symptoms.    CHRONIC PAIN MANAGEMENT:  - Refilled medication for pain management to be used as needed.  - Philip keeps Naloxone on hand and uses pain medication only when experiencing pain.  - Assessed the patient's pain management needs and medication usage patterns.  - Refilled Naloxone prescription to be maintained alongside pain medication.    ALLERGIC RHINITIS (HAY FEVER):  - Philip is experiencing hay fever symptoms.  - Evaluated sinus problems and current medication usage.  - Discussed sinus medication management and  advised that patient can request refills of sinus medication as needed.    GENERAL HEALTH RECOMMENDATIONS:  - Discussed importance of proper hydration, especially when playing golf, to maintain accurate blood test results.           Medication List with Changes/Refills   Current Medications    MUPIROCIN (BACTROBAN) 2 % OINTMENT    Apply topically 3 (three) times daily.    TRIAMCINOLONE ACETONIDE 0.1% (KENALOG) 0.1 % CREAM    APPLY TO AFFECTED AREAS TWICE DAILY FOR UP TO 2 WEEKS, THEN TAKE 1 WEEK BREAK OR DECREASE TO 3 TIMES WEEKLY. DO NOT APPLY TO FACE/GROIN.   Changed and/or Refilled Medications    Modified Medication Previous Medication    CHLORZOXAZONE (PARAFON FORTE) 500 MG TAB chlorzoxazone (PARAFON FORTE) 500 mg Tab       Take 1 tablet (500 mg total) by mouth every 6 (six) hours as needed (muscle spasm).    Take 1 tablet (500 mg total) by mouth every 6 (six) hours as needed (muscle spasm).    TRAMADOL (ULTRAM) 50 MG TABLET traMADoL (ULTRAM) 50 mg tablet       Take 1 tablet (50 mg total) by mouth every 8 (eight) hours as needed for Pain.    Take 1 tablet (50 mg total) by mouth every 8 (eight) hours as needed for Pain.    ZOLPIDEM (AMBIEN CR) 12.5 MG CR TABLET zolpidem (AMBIEN CR) 12.5 MG CR tablet       Take 1 tablet (12.5 mg total) by mouth nightly as needed for Insomnia.    Take 1 tablet (12.5 mg total) by mouth nightly as needed for Insomnia.   Discontinued Medications    DICLOFENAC SODIUM (SOLARAZE) 3 % GEL    Apply  MP joint finger TID        This note was generated with the assistance of ambient listening technology. Verbal consent was obtained by the patient and accompanying visitor(s) for the recording of patient appointment to facilitate this note. I attest to having reviewed and edited the generated note for accuracy, though some syntax or spelling errors may persist. Please contact the author of this note for any clarification.

## 2025-05-08 RX ORDER — CHLORZOXAZONE 500 MG/1
500 TABLET ORAL EVERY 6 HOURS PRN
Start: 2025-05-08